# Patient Record
Sex: MALE | Race: BLACK OR AFRICAN AMERICAN
[De-identification: names, ages, dates, MRNs, and addresses within clinical notes are randomized per-mention and may not be internally consistent; named-entity substitution may affect disease eponyms.]

---

## 2019-11-09 ENCOUNTER — HOSPITAL ENCOUNTER (EMERGENCY)
Dept: HOSPITAL 62 - ER | Age: 64
Discharge: HOME | End: 2019-11-09
Payer: OTHER GOVERNMENT

## 2019-11-09 VITALS — DIASTOLIC BLOOD PRESSURE: 72 MMHG | SYSTOLIC BLOOD PRESSURE: 122 MMHG

## 2019-11-09 DIAGNOSIS — J98.01: Primary | ICD-10-CM

## 2019-11-09 DIAGNOSIS — F17.210: ICD-10-CM

## 2019-11-09 DIAGNOSIS — J44.9: ICD-10-CM

## 2019-11-09 DIAGNOSIS — R07.9: ICD-10-CM

## 2019-11-09 LAB
ADD MANUAL DIFF: NO
ALBUMIN SERPL-MCNC: 4.4 G/DL (ref 3.5–5)
ALP SERPL-CCNC: 71 U/L (ref 38–126)
ANION GAP SERPL CALC-SCNC: 10 MMOL/L (ref 5–19)
AST SERPL-CCNC: 37 U/L (ref 17–59)
BASOPHILS # BLD AUTO: 0 10^3/UL (ref 0–0.2)
BASOPHILS NFR BLD AUTO: 0.8 % (ref 0–2)
BILIRUB DIRECT SERPL-MCNC: 0.1 MG/DL (ref 0–0.4)
BILIRUB SERPL-MCNC: 0.4 MG/DL (ref 0.2–1.3)
BUN SERPL-MCNC: 21 MG/DL (ref 7–20)
CALCIUM: 9.7 MG/DL (ref 8.4–10.2)
CHLORIDE SERPL-SCNC: 102 MMOL/L (ref 98–107)
CO2 SERPL-SCNC: 28 MMOL/L (ref 22–30)
EOSINOPHIL # BLD AUTO: 0.3 10^3/UL (ref 0–0.6)
EOSINOPHIL NFR BLD AUTO: 7.6 % (ref 0–6)
ERYTHROCYTE [DISTWIDTH] IN BLOOD BY AUTOMATED COUNT: 14.1 % (ref 11.5–14)
GLUCOSE SERPL-MCNC: 79 MG/DL (ref 75–110)
HCT VFR BLD CALC: 42.3 % (ref 37.9–51)
HGB BLD-MCNC: 14.1 G/DL (ref 13.5–17)
LYMPHOCYTES # BLD AUTO: 1.7 10^3/UL (ref 0.5–4.7)
LYMPHOCYTES NFR BLD AUTO: 36.5 % (ref 13–45)
MCH RBC QN AUTO: 29.3 PG (ref 27–33.4)
MCHC RBC AUTO-ENTMCNC: 33.4 G/DL (ref 32–36)
MCV RBC AUTO: 88 FL (ref 80–97)
MONOCYTES # BLD AUTO: 0.4 10^3/UL (ref 0.1–1.4)
MONOCYTES NFR BLD AUTO: 8.5 % (ref 3–13)
NEUTROPHILS # BLD AUTO: 2.1 10^3/UL (ref 1.7–8.2)
NEUTS SEG NFR BLD AUTO: 46.6 % (ref 42–78)
NT PRO BNP: 111 PG/ML (ref ?–125)
PLATELET # BLD: 284 10^3/UL (ref 150–450)
POTASSIUM SERPL-SCNC: 4.4 MMOL/L (ref 3.6–5)
PROT SERPL-MCNC: 8 G/DL (ref 6.3–8.2)
RBC # BLD AUTO: 4.82 10^6/UL (ref 4.35–5.55)
TOTAL CELLS COUNTED % (AUTO): 100 %
TROPONIN I SERPL-MCNC: < 0.012 NG/ML
WBC # BLD AUTO: 4.5 10^3/UL (ref 4–10.5)

## 2019-11-09 PROCEDURE — 99285 EMERGENCY DEPT VISIT HI MDM: CPT

## 2019-11-09 PROCEDURE — 80053 COMPREHEN METABOLIC PANEL: CPT

## 2019-11-09 PROCEDURE — 83880 ASSAY OF NATRIURETIC PEPTIDE: CPT

## 2019-11-09 PROCEDURE — 94640 AIRWAY INHALATION TREATMENT: CPT

## 2019-11-09 PROCEDURE — 93005 ELECTROCARDIOGRAM TRACING: CPT

## 2019-11-09 PROCEDURE — 71046 X-RAY EXAM CHEST 2 VIEWS: CPT

## 2019-11-09 PROCEDURE — 84484 ASSAY OF TROPONIN QUANT: CPT

## 2019-11-09 PROCEDURE — 85025 COMPLETE CBC W/AUTO DIFF WBC: CPT

## 2019-11-09 PROCEDURE — 96375 TX/PRO/DX INJ NEW DRUG ADDON: CPT

## 2019-11-09 PROCEDURE — 93010 ELECTROCARDIOGRAM REPORT: CPT

## 2019-11-09 PROCEDURE — 96374 THER/PROPH/DIAG INJ IV PUSH: CPT

## 2019-11-09 PROCEDURE — 36415 COLL VENOUS BLD VENIPUNCTURE: CPT

## 2019-11-09 RX ADMIN — ALBUTEROL SULFATE SCH MG: 2.5 SOLUTION RESPIRATORY (INHALATION) at 18:18

## 2019-11-09 RX ADMIN — ALBUTEROL SULFATE SCH MG: 2.5 SOLUTION RESPIRATORY (INHALATION) at 18:17

## 2019-11-09 NOTE — ER DOCUMENT REPORT
ED Medical Screen (RME)





- General


Chief Complaint: Breathing Difficulty


Stated Complaint: DIFFICULTY BREATHING


Time Seen by Provider: 11/09/19 17:19


Mode of Arrival: Ambulatory


Information source: Patient


Notes: 





Patient presents complaining of shortness of breath that started yesterday.  

Patient denies any chest pain nausea vomiting or fever.  Patient does report 

cough but states he has chronic cough.  Patient does have a long history of 

smoking.  Patient does not regularly see a doctor.





I have greeted and performed a rapid initial assessment of this patient.  A 

comprehensive ED assessment and evaluation of the patient, analysis of test 

results and completion of the medical decision making process will be conducted 

by additional ED providers.


TRAVEL OUTSIDE OF THE [place] IN LAST 30 DAYS: No





Past Medical History


Past Surgical History: Reports: Other - Eye surgery





- Immunizations


Hx Diphtheria, Pertussis, Tetanus Vaccination: No





Physical Exam





- Vital signs


Vitals: 





                                        











Temp Pulse Resp BP Pulse Ox


 


 97.8 F   83   16   152/81 H  100 


 


 11/09/19 17:15  11/09/19 17:15  11/09/19 17:15  11/09/19 17:15  11/09/19 17:15














- Respiratory


Respiratory status: No respiratory distress


Chest status: Nontender


Breath sounds: Nonproductive cough, Wheezing





Course





- Vital Signs


Vital signs: 





                                        











Temp Pulse Resp BP Pulse Ox


 


 97.8 F   83   16   152/81 H  100 


 


 11/09/19 17:15  11/09/19 17:15  11/09/19 17:15  11/09/19 17:15  11/09/19 17:15

## 2019-11-09 NOTE — RADIOLOGY REPORT (SQ)
EXAM DESCRIPTION:  CHEST 2 VIEWS



COMPLETED DATE/TIME:  11/9/2019 5:44 pm



REASON FOR STUDY:  sob



COMPARISON:  None.



EXAM PARAMETERS:  NUMBER OF VIEWS: two views

TECHNIQUE: Digital Frontal and Lateral radiographic views of the chest acquired.

RADIATION DOSE: NA

LIMITATIONS: none



FINDINGS:  LUNGS AND PLEURA: No acute infiltrates or effusions.  Calcification overlying right lower 
lung field between posterior 10th and 11th ribs consistent with calcified granuloma.

MEDIASTINUM AND HILAR STRUCTURES: No masses or contour abnormalities.

HEART AND VASCULAR STRUCTURES: Normal heart size and pulmonary vasculature.

BONES: No acute findings.

HARDWARE: None in the chest.

OTHER: No other significant finding.



IMPRESSION:  NO ACUTE DISEASE.



TECHNICAL DOCUMENTATION:  JOB ID:  [id number]

[id number]

 2011 [employer]- All Rights Reserved



Reading location - IP/workstation name: [email or web address]

## 2019-11-09 NOTE — ER DOCUMENT REPORT
ED General





- General


Chief Complaint: Shortness Of Breath


Stated Complaint: DIFFICULTY BREATHING


Time Seen by Provider: 11/09/19 17:19


Primary Care Provider: 


[facility] [[facility] - Follow up as needed


Mode of Arrival: Ambulatory


Information source: Patient


Notes: 


64-year-old man-year-old male presents with shortness of breath and chest 

tightness.  He has had intermittent episodes of difficulties in the past worse 

over the past 24 hours.  He has a 40-year pack per day smoking history and 

denies hematemesis or productive cough.  He complains of a tightness in his 

chest and a difficulty moving air.  He has been self-medicating with a rescue 

inhaler in the past, however has not seen a physician and was not prescribed any

medications.  He denies chest pain, palpitations or swelling in the lower 

extremities.


TRAVEL OUTSIDE OF THE [place] IN LAST 30 DAYS: No





- Related Data


Allergies/Adverse Reactions: 


                                        





No Known Allergies Allergy (Unverified 11/09/19 17:26)


   











Past Medical History





- General


Information source: Patient





- Social History


Smoking Status: Current Every Day Smoker


Chew tobacco use (# tins/day): No


Frequency of alcohol use: Occasional


Drug Abuse: Cocaine


Family History: Reviewed & Not Pertinent


Patient has suicidal ideation: No


Patient has homicidal ideation: No


Past Surgical History: Reports: Other - Eye surgery





- Immunizations


Hx Diphtheria, Pertussis, Tetanus Vaccination: No





Review of Systems





- Review of Systems


Notes: 





REVIEW OF SYSTEMS


GENERAL: Negative for any nausea, vomiting, fevers, chills, or weight loss.


NEUROLOGIC: Negative for any blurry vision, blind spots, double vision, facial 

asymmetry, dysphagia, dysarthria, hemiparesis, hemisensory deficits, vertigo, 

ataxia.


HEENT: Negative for any head trauma, neck trauma, neck stiffness, photophobia, 

phonophobia, sinusitis, rhinitis.


CARDIAC: Negative for any chest pain, dyspnea on exertion, paroxysmal nocturnal 

dyspnea, peripheral edema.


PULMONARY:+shortness of breath, +wheezing, no COPD, or TB exposure.


GASTROINTESTINAL: Negative for any abdominal pain, nausea, vomiting, bright red 

blood per rectum, melena.


GENITOURINARY: Negative for any dysuria, hematuria, incontinence.


INTEGUMENTARY: Negative for any rashes, cuts, insect bites.


RHEUMATOLOGIC: Negative for any joint pains, photosensitive rashes, history of 

vasculitis or kidney problems.


HEMATOLOGIC: Negative for any abnormal bruising, frequent infections or 

bleeding.  I made no change





Physical Exam





- Vital signs


Vitals: 


                                        











Pulse Ox


 


 97 


 


 11/09/19 17:11














- Notes


Notes: 





Reviewed vital signs and nursing note as charted by RN. 


CONSTITUTIONAL: Thin, alert and responsive 64-year-old man in no acute distress.


HEAD: Normocephalic; atraumatic; No swelling


EYES: PERRL; Conjunctivae clear, no drainage; EOMI


ENT: External ears without lesions; External auditory canal is patent; TMs 

without erythema, landmarks clear and well visualized; no rhinorrhea; Pharynx 

without erythema or lesions, no tonsillar hypertrophy, airway patent, mucous me

mbranes pink and moist


NECK: Supple, no cervical lymphadenopathy, no masses


CARD: Regular rate and rhythm; no murmurs, no rubs, no gallops, capillary refill

< 2 seconds, symmetric pulses


RESP:  Respiratory rate and effort are normal. There is normal chest excursion. 

Mild expiratory wheezes, no respiratory distress, no retractions, no stridor, no

nasal flaring, no accessory muscle use.  The lungs are clear to auscultation 

bilaterally, no wheezing, no rales, no rhonchi.  


ABD/GI: Normal bowel sounds; non-distended; soft, non-tender, no rebound, no 

guarding, no palpable organomegaly


EXT: Normal ROM in all joints; non-tender to palpation; no effusions, no edema 


SKIN: Normal color for age and race; warm; dry; good turgor; no acute lesions 

noted


NEURO: No facial asymmetry; Moves all extremities equally; Motor and sensory 

function intact





Course





- Re-evaluation


Re-evalutation: 





11/09/19 20:02


Patient is sleeping and resting comfortably after nebulizer treatment and Solu-

Medrol.  Chest x-ray is negative I discussed those findings with the patient and

his sister, he will be given p.o. antibiotics, steroids, rescue inhaler I 

encouraged him to decrease his smoking [facility].  The patient affirms and understanding of this plan and is ready to go 

home.





- Vital Signs


Vital signs: 


                                        











Temp Pulse Resp BP Pulse Ox


 


 97.8 F   83   17   125/79   94 


 


 11/09/19 17:15  11/09/19 17:15  11/09/19 19:01  11/09/19 19:00  11/09/19 19:01














- Laboratory


Result Diagrams: 


                                 11/09/19 18:29





                                 11/09/19 18:29


Laboratory results interpreted by me: 


                                        











  11/09/19 11/09/19





  18:29 18:29


 


RDW  14.1 H 


 


Eos % (Auto)  7.6 H 


 


BUN   21 H


 


Creatinine   1.62 H


 


Est GFR ( Amer)   53 L


 


Est GFR (MDRD) Non-Af   43 L














- Diagnostic Test


Radiology reviewed: Image reviewed - Normal chest x-ray no infiltrate or 

effusion., Reports reviewed





Discharge





- Discharge


Clinical Impression: 


 Bronchospasm





Acute bronchitis


Qualifiers:


 Bronchitis organism: other organism Qualified Code(s): J20.8 - Acute bronchitis

due to other specified organisms





COPD (chronic obstructive pulmonary disease)


Qualifiers:


 COPD type: unspecified COPD Qualified Code(s): J44.9 - Chronic obstructive 

pulmonary disease, unspecified





Condition: Good


Disposition: HOME, SELF-CARE


Forms:  Smoking Cessation Education


Referrals: 


[facility] [[facility]] - Follow up as needed

## 2019-11-09 NOTE — EKG REPORT
SEVERITY:- ABNORMAL ECG -

SINUS RHYTHM

BIATRIAL ABNORMALITIES

NONSPECIFIC T ABNORMALITIES, ANT-LAT LEADS

:

Confirmed by: Mary Anne Howard 09-Nov-2019 21:30:04

## 2020-03-12 ENCOUNTER — HOSPITAL ENCOUNTER (EMERGENCY)
Dept: HOSPITAL 62 - ER | Age: 65
Discharge: HOME | End: 2020-03-12
Payer: OTHER GOVERNMENT

## 2020-03-12 VITALS — DIASTOLIC BLOOD PRESSURE: 101 MMHG | SYSTOLIC BLOOD PRESSURE: 152 MMHG

## 2020-03-12 DIAGNOSIS — Z79.899: ICD-10-CM

## 2020-03-12 DIAGNOSIS — R06.02: ICD-10-CM

## 2020-03-12 DIAGNOSIS — F14.10: ICD-10-CM

## 2020-03-12 DIAGNOSIS — F17.200: ICD-10-CM

## 2020-03-12 DIAGNOSIS — J98.01: ICD-10-CM

## 2020-03-12 DIAGNOSIS — J44.9: Primary | ICD-10-CM

## 2020-03-12 DIAGNOSIS — I51.7: ICD-10-CM

## 2020-03-12 LAB
ADD MANUAL DIFF: NO
ALBUMIN SERPL-MCNC: 4.3 G/DL (ref 3.5–5)
ALP SERPL-CCNC: 82 U/L (ref 38–126)
ANION GAP SERPL CALC-SCNC: 5 MMOL/L (ref 5–19)
AST SERPL-CCNC: 38 U/L (ref 17–59)
BASOPHILS # BLD AUTO: 0 10^3/UL (ref 0–0.2)
BASOPHILS NFR BLD AUTO: 0.8 % (ref 0–2)
BILIRUB DIRECT SERPL-MCNC: 0 MG/DL (ref 0–0.4)
BILIRUB SERPL-MCNC: 0.3 MG/DL (ref 0.2–1.3)
BUN SERPL-MCNC: 30 MG/DL (ref 7–20)
CALCIUM: 9.2 MG/DL (ref 8.4–10.2)
CHLORIDE SERPL-SCNC: 103 MMOL/L (ref 98–107)
CO2 SERPL-SCNC: 32 MMOL/L (ref 22–30)
EOSINOPHIL # BLD AUTO: 0.2 10^3/UL (ref 0–0.6)
EOSINOPHIL NFR BLD AUTO: 5.6 % (ref 0–6)
ERYTHROCYTE [DISTWIDTH] IN BLOOD BY AUTOMATED COUNT: 14.3 % (ref 11.5–14)
GLUCOSE SERPL-MCNC: 103 MG/DL (ref 75–110)
HCT VFR BLD CALC: 40.8 % (ref 37.9–51)
HGB BLD-MCNC: 13.8 G/DL (ref 13.5–17)
LYMPHOCYTES # BLD AUTO: 1.7 10^3/UL (ref 0.5–4.7)
LYMPHOCYTES NFR BLD AUTO: 40.8 % (ref 13–45)
MCH RBC QN AUTO: 29 PG (ref 27–33.4)
MCHC RBC AUTO-ENTMCNC: 33.7 G/DL (ref 32–36)
MCV RBC AUTO: 86 FL (ref 80–97)
MONOCYTES # BLD AUTO: 0.6 10^3/UL (ref 0.1–1.4)
MONOCYTES NFR BLD AUTO: 13.7 % (ref 3–13)
NEUTROPHILS # BLD AUTO: 1.6 10^3/UL (ref 1.7–8.2)
NEUTS SEG NFR BLD AUTO: 39.1 % (ref 42–78)
PLATELET # BLD: 213 10^3/UL (ref 150–450)
POTASSIUM SERPL-SCNC: 4.7 MMOL/L (ref 3.6–5)
PROT SERPL-MCNC: 7.7 G/DL (ref 6.3–8.2)
RBC # BLD AUTO: 4.75 10^6/UL (ref 4.35–5.55)
TOTAL CELLS COUNTED % (AUTO): 100 %
WBC # BLD AUTO: 4.2 10^3/UL (ref 4–10.5)

## 2020-03-12 PROCEDURE — 85025 COMPLETE CBC W/AUTO DIFF WBC: CPT

## 2020-03-12 PROCEDURE — 94640 AIRWAY INHALATION TREATMENT: CPT

## 2020-03-12 PROCEDURE — 93010 ELECTROCARDIOGRAM REPORT: CPT

## 2020-03-12 PROCEDURE — 99285 EMERGENCY DEPT VISIT HI MDM: CPT

## 2020-03-12 PROCEDURE — 93005 ELECTROCARDIOGRAM TRACING: CPT

## 2020-03-12 PROCEDURE — 71046 X-RAY EXAM CHEST 2 VIEWS: CPT

## 2020-03-12 PROCEDURE — 36415 COLL VENOUS BLD VENIPUNCTURE: CPT

## 2020-03-12 PROCEDURE — 80053 COMPREHEN METABOLIC PANEL: CPT

## 2020-03-12 NOTE — EKG REPORT
SEVERITY:- ABNORMAL ECG -

SINUS RHYTHM

CONSIDER LEFT VENTRICULAR HYPERTROPHY

ANTERIOR ST ELEVATION, PROBABLY DUE TO LVH

:

Confirmed by: Mary Anne Howard 12-Mar-2020 09:57:28

## 2020-03-12 NOTE — RADIOLOGY REPORT (SQ)
EXAM:

  XR Chest, 2 Views



EXAM DATE/TIME:

  03/12/2020 1:53 AM



CLINICAL HISTORY:

  The patient is 64 years old and is Male; shortness of breath



TECHNIQUE:

  Frontal and lateral views of the chest.



COMPARISON:

  Chest radiograph from 11/09/2019



FINDINGS:

  LUNGS:  The lungs are hyperinflated. Probable small calcified

granuloma projecting over the right lower lung. The lungs are

otherwise clear. No consolidation.

  PLEURAL SPACE:  Unremarkable.  No pneumothorax.

  HEART:  No significant enlargement of the cardiac silhouette.

  MEDIASTINUM:  Unremarkable.

  BONES/JOINTS:  No acute osseous findings.



IMPRESSION:     

  No acute findings visualized in the chest.

## 2020-03-12 NOTE — ER DOCUMENT REPORT
ED Respiratory Problem





- General


Chief Complaint: Breathing Difficulty


Stated Complaint: DIFFICULTY BREATHING


Time Seen by Provider: 03/12/20 03:32


Primary Care Provider: 


ELEAZAR BARTHOLOMEW [Primary Care Provider] - Follow up as needed


Notes: 





64-year-old man presents to the emergency department complaining of shortness of

breath and difficulty breathing.  Apparently he has a history of shortness of 

breath and uses an inhaler.  States that he has been using inhaler more 

frequently lately.  He denies chest pain or palpitations.  He also denies fever 

or productive cough.He has a 40-year pack per day smoking history and denies 

hematemesis or productive cough. 


TRAVEL OUTSIDE OF THE [place] IN LAST 30 DAYS: No





- Related Data


Allergies/Adverse Reactions: 


                                        





No Known Allergies Allergy (Unverified 11/09/19 17:26)


   








Home Medications: albuterol inhaler





Past Medical History





- Social History


Smoking Status: Current Every Day Smoker


Frequency of alcohol use: Occasional


Drug Abuse: Cocaine


Family History: Reviewed & Not Pertinent


Patient has suicidal ideation: No


Patient has homicidal ideation: No


Pulmonary Medical History: Reports: Hx Asthma, Hx COPD


Past Surgical History: Reports: Other - Eye surgery





- Immunizations


Hx Diphtheria, Pertussis, Tetanus Vaccination: No





Review of Systems





- Review of Systems


Notes: 





Constitutional: Negative for fever.


HENT: Negative for sore throat.


Eyes: Negative for visual changes.


Cardiovascular: Negative for chest pain.


Respiratory: + shortness of breath.


Gastrointestinal: Negative for abdominal pain, vomiting or diarrhea.


Genitourinary: Negative for dysuria.


Musculoskeletal: Negative for back pain.


Skin: Negative for rash.


Neurological: Negative for headaches, weakness or numbness.





10 point ROS negative except as marked above and in HPI.





Physical Exam





- Vital signs


Vitals: 


                                        











Temp Pulse Resp BP Pulse Ox


 


 97.9 F   71   20   173/90 H  95 


 


 03/12/20 00:53  03/12/20 00:53  03/12/20 00:53  03/12/20 00:53  03/12/20 00:53














- Notes


Notes: 





PHYSICAL EXAMINATION:


 


Physical Exam:


General: Well-nourished well-developed 64-year-old man in no acute distress


HEENT: NC/AT, pupils equal round and reactive to light, MM moist,nares clear, 

oropharynx clear, airway patent


Neck: supple, no adenopathy, no masses.  Good range of motion


Lungs: clear, no wheezing, no rales no rhonchi


CVS: Regular rate and rhythm no murmur gallop or rub


Abdomen: Soft, active, nontender, no masses, no hepatosplenomegaly


Ext:   No edema, clubbing or cyanosis.


Neuro: Alert and responsive, moving all 4 extremities on command, cranial nerves

intact, no focal findings


Skin: Intact no open lesions, no rash


PSYCH: Normal mood, normal affect.


 








Course





- Re-evaluation


Re-evalutation: 





03/12/20 06:17


Nebulizer treatment with improvement of his symptoms states that he feels much 

better.  Patient is resting quietly on the bed sleeping, prescription for 

albuterol inhaler and prednisone will be given at the time of discharge.  I have

encouraged the patient to follow-up with the [facility] and he states that he 

will.


03/12/20 06:28








- Vital Signs


Vital signs: 


                                        











Temp Pulse Resp BP Pulse Ox


 


 97.7 F   71   16   133/86 H  98 


 


 03/12/20 06:01  03/12/20 00:53  03/12/20 06:01  03/12/20 06:01  03/12/20 06:01














- Laboratory


Result Diagrams: 


                                 03/12/20 01:35





                                 03/12/20 01:35


Laboratory results interpreted by me: 


                                        











  03/12/20 03/12/20





  01:35 01:35


 


RDW  14.3 H 


 


Mono % (Auto)  13.7 H 


 


Absolute Neuts (auto)  1.6 L 


 


Seg Neutrophils %  39.1 L 


 


Carbon Dioxide   32 H


 


BUN   30 H


 


Creatinine   1.54 H


 


Est GFR ( Amer)   56 L


 


Est GFR (MDRD) Non-Af   46 L














- Diagnostic Test


Radiology reviewed: Image reviewed, Reports reviewed - Chest x-ray: No acute 

infiltrate or effusion





- EKG Interpretation by Me


EKG shows normal: Sinus rhythm - Rate of 54, LVH, repolarization abnormality.





Discharge





- Discharge


Clinical Impression: 


 Acute bronchospasm





COPD (chronic obstructive pulmonary disease)


Qualifiers:


 COPD type: chronic bronchitis Chronic bronchitis type: simple Qualified 

Code(s): J41.0 - Simple chronic bronchitis





Condition: Good


Disposition: HOME, SELF-CARE


Instructions:  Chronic Obstructive Lung Disease ([facility])


Additional Instructions: 


You were seen for a COPD exacerbation.  Your symptoms improved with treatment 

here in the emergency department.  However, it is very important that you return

to the emergency department immediately if you began to have worsening 

difficulty breathing that does not respond to your normal home nebulizers.  You 

are also being sent home on a five-day course of steroids that you should start 

taking tomorrow.  Please also follow closely with your primary care physician.  

You should eturn to emergency department if you develop fever greater than 101, 

persistent cough, persistent vomiting, pass out, or any other symptoms that are 

concerning to you.


Prescriptions: 


Prednisone [Deltasone 20 mg Tablet] 1 tab PO BID 5 Days #10 tablet


Albuterol Sulfate [Proair HFA Inhalation Aerosol 8.5 gm MDI] 2 puff IH Q4H PRN 

#1 mdi


 PRN Reason: 


Referrals: 


[facility] [[facility] - Follow up as needed

## 2020-04-12 ENCOUNTER — HOSPITAL ENCOUNTER (INPATIENT)
Dept: HOSPITAL 62 - ER | Age: 65
LOS: 3 days | Discharge: HOME | DRG: 190 | End: 2020-04-15
Attending: INTERNAL MEDICINE | Admitting: INTERNAL MEDICINE
Payer: OTHER GOVERNMENT

## 2020-04-12 DIAGNOSIS — Z03.818: ICD-10-CM

## 2020-04-12 DIAGNOSIS — J44.0: ICD-10-CM

## 2020-04-12 DIAGNOSIS — E87.1: ICD-10-CM

## 2020-04-12 DIAGNOSIS — J96.01: ICD-10-CM

## 2020-04-12 DIAGNOSIS — N17.9: ICD-10-CM

## 2020-04-12 DIAGNOSIS — F17.210: ICD-10-CM

## 2020-04-12 DIAGNOSIS — Z60.2: ICD-10-CM

## 2020-04-12 DIAGNOSIS — J44.1: Primary | ICD-10-CM

## 2020-04-12 DIAGNOSIS — E87.5: ICD-10-CM

## 2020-04-12 DIAGNOSIS — E86.0: ICD-10-CM

## 2020-04-12 DIAGNOSIS — Z79.899: ICD-10-CM

## 2020-04-12 DIAGNOSIS — Z72.89: ICD-10-CM

## 2020-04-12 DIAGNOSIS — J18.9: ICD-10-CM

## 2020-04-12 LAB
A TYPE INFLUENZA AG: NEGATIVE
ADD MANUAL DIFF: NO
ALBUMIN SERPL-MCNC: 4.4 G/DL (ref 3.5–5)
ALP SERPL-CCNC: 70 U/L (ref 38–126)
ANION GAP SERPL CALC-SCNC: 11 MMOL/L (ref 5–19)
APPEARANCE UR: (no result)
APTT PPP: YELLOW S
AST SERPL-CCNC: 51 U/L (ref 17–59)
B INFLUENZA AG: NEGATIVE
BASOPHILS # BLD AUTO: 0 10^3/UL (ref 0–0.2)
BASOPHILS NFR BLD AUTO: 0.2 % (ref 0–2)
BILIRUB DIRECT SERPL-MCNC: 0.1 MG/DL (ref 0–0.4)
BILIRUB SERPL-MCNC: 1.3 MG/DL (ref 0.2–1.3)
BILIRUB UR QL STRIP: NEGATIVE
BUN SERPL-MCNC: 37 MG/DL (ref 7–20)
CALCIUM: 9.1 MG/DL (ref 8.4–10.2)
CHLORIDE SERPL-SCNC: 100 MMOL/L (ref 98–107)
CK MB SERPL-MCNC: 0.75 NG/ML (ref ?–4.55)
CK SERPL-CCNC: 344 U/L (ref 55–170)
CO2 SERPL-SCNC: 24 MMOL/L (ref 22–30)
CRP SERPL-MCNC: 264.2 MG/L (ref ?–10)
EOSINOPHIL # BLD AUTO: 0 10^3/UL (ref 0–0.6)
EOSINOPHIL NFR BLD AUTO: 0 % (ref 0–6)
ERYTHROCYTE [DISTWIDTH] IN BLOOD BY AUTOMATED COUNT: 15.1 % (ref 11.5–14)
FERRITIN SERPL-MCNC: 340 NG/ML (ref 17.9–464)
GLUCOSE SERPL-MCNC: 119 MG/DL (ref 75–110)
GLUCOSE UR STRIP-MCNC: NEGATIVE MG/DL
HCT VFR BLD CALC: 45 % (ref 37.9–51)
HGB BLD-MCNC: 15 G/DL (ref 13.5–17)
KETONES UR STRIP-MCNC: NEGATIVE MG/DL
LYMPHOCYTES # BLD AUTO: 1.7 10^3/UL (ref 0.5–4.7)
LYMPHOCYTES NFR BLD AUTO: 14.4 % (ref 13–45)
MCH RBC QN AUTO: 28.7 PG (ref 27–33.4)
MCHC RBC AUTO-ENTMCNC: 33.3 G/DL (ref 32–36)
MCV RBC AUTO: 86 FL (ref 80–97)
MONOCYTES # BLD AUTO: 0.5 10^3/UL (ref 0.1–1.4)
MONOCYTES NFR BLD AUTO: 4.6 % (ref 3–13)
NEUTROPHILS # BLD AUTO: 9.4 10^3/UL (ref 1.7–8.2)
NEUTS SEG NFR BLD AUTO: 80.8 % (ref 42–78)
NITRITE UR QL STRIP: NEGATIVE
NT PRO BNP: 508 PG/ML (ref ?–125)
PH UR STRIP: 5 [PH] (ref 5–9)
PLATELET # BLD: 221 10^3/UL (ref 150–450)
POTASSIUM SERPL-SCNC: 5.2 MMOL/L (ref 3.6–5)
PROT SERPL-MCNC: 8 G/DL (ref 6.3–8.2)
PROT UR STRIP-MCNC: 30 MG/DL
RBC # BLD AUTO: 5.22 10^6/UL (ref 4.35–5.55)
SP GR UR STRIP: 1.02
TOTAL CELLS COUNTED % (AUTO): 100 %
TROPONIN I SERPL-MCNC: 0.01 NG/ML
UROBILINOGEN UR-MCNC: NEGATIVE MG/DL (ref ?–2)
WBC # BLD AUTO: 11.6 10^3/UL (ref 4–10.5)

## 2020-04-12 PROCEDURE — 94799 UNLISTED PULMONARY SVC/PX: CPT

## 2020-04-12 PROCEDURE — 87040 BLOOD CULTURE FOR BACTERIA: CPT

## 2020-04-12 PROCEDURE — 87070 CULTURE OTHR SPECIMN AEROBIC: CPT

## 2020-04-12 PROCEDURE — 80048 BASIC METABOLIC PNL TOTAL CA: CPT

## 2020-04-12 PROCEDURE — 85025 COMPLETE CBC W/AUTO DIFF WBC: CPT

## 2020-04-12 PROCEDURE — 86140 C-REACTIVE PROTEIN: CPT

## 2020-04-12 PROCEDURE — 82728 ASSAY OF FERRITIN: CPT

## 2020-04-12 PROCEDURE — 82550 ASSAY OF CK (CPK): CPT

## 2020-04-12 PROCEDURE — 71045 X-RAY EXAM CHEST 1 VIEW: CPT

## 2020-04-12 PROCEDURE — 85379 FIBRIN DEGRADATION QUANT: CPT

## 2020-04-12 PROCEDURE — 87804 INFLUENZA ASSAY W/OPTIC: CPT

## 2020-04-12 PROCEDURE — 36415 COLL VENOUS BLD VENIPUNCTURE: CPT

## 2020-04-12 PROCEDURE — 85027 COMPLETE CBC AUTOMATED: CPT

## 2020-04-12 PROCEDURE — 87635 SARS-COV-2 COVID-19 AMP PRB: CPT

## 2020-04-12 PROCEDURE — 80053 COMPREHEN METABOLIC PANEL: CPT

## 2020-04-12 PROCEDURE — 82553 CREATINE MB FRACTION: CPT

## 2020-04-12 PROCEDURE — 99285 EMERGENCY DEPT VISIT HI MDM: CPT

## 2020-04-12 PROCEDURE — 96365 THER/PROPH/DIAG IV INF INIT: CPT

## 2020-04-12 PROCEDURE — 93010 ELECTROCARDIOGRAM REPORT: CPT

## 2020-04-12 PROCEDURE — 87205 SMEAR GRAM STAIN: CPT

## 2020-04-12 PROCEDURE — 84484 ASSAY OF TROPONIN QUANT: CPT

## 2020-04-12 PROCEDURE — 81001 URINALYSIS AUTO W/SCOPE: CPT

## 2020-04-12 PROCEDURE — 83880 ASSAY OF NATRIURETIC PEPTIDE: CPT

## 2020-04-12 PROCEDURE — 93005 ELECTROCARDIOGRAM TRACING: CPT

## 2020-04-12 RX ADMIN — ENOXAPARIN SODIUM SCH MG: 80 INJECTION SUBCUTANEOUS at 21:12

## 2020-04-12 RX ADMIN — NICOTINE SCH EACH: 21 PATCH, EXTENDED RELEASE TOPICAL at 16:49

## 2020-04-12 RX ADMIN — ALBUTEROL SULFATE SCH PUFF: 90 AEROSOL, METERED RESPIRATORY (INHALATION) at 17:00

## 2020-04-12 RX ADMIN — Medication SCH ML: at 16:50

## 2020-04-12 RX ADMIN — FAMOTIDINE SCH MG: 20 TABLET, FILM COATED ORAL at 21:14

## 2020-04-12 RX ADMIN — MELATONIN SCH MG: 3 TAB ORAL at 21:13

## 2020-04-12 RX ADMIN — Medication SCH ML: at 21:14

## 2020-04-12 RX ADMIN — OXYCODONE HYDROCHLORIDE AND ACETAMINOPHEN SCH MG: 500 TABLET ORAL at 17:01

## 2020-04-12 RX ADMIN — SODIUM CHLORIDE PRN MLS/HR: 9 INJECTION, SOLUTION INTRAVENOUS at 19:00

## 2020-04-12 RX ADMIN — ACETAMINOPHEN PRN MG: 325 TABLET ORAL at 21:14

## 2020-04-12 RX ADMIN — ACETAMINOPHEN PRN MG: 325 TABLET ORAL at 16:49

## 2020-04-12 RX ADMIN — GUAIFENESIN SCH MG: 600 TABLET, EXTENDED RELEASE ORAL at 21:13

## 2020-04-12 SDOH — SOCIAL STABILITY - SOCIAL INSECURITY: PROBLEMS RELATED TO LIVING ALONE: Z60.2

## 2020-04-12 NOTE — ER DOCUMENT REPORT
ED General





- General


Chief Complaint: Shortness Of Breath


Stated Complaint: SHORTNESS OF BREATH


Time Seen by Provider: 04/12/20 12:11


Primary Care Provider: 


[place] [Primary [facility] Provider] - Follow up as needed


TRAVEL OUTSIDE OF THE [place] IN LAST 30 DAYS: No





- HPI


Notes: 





Chief complaint: Shortness of breath, wheezing, pleuritic chest pain, productive

cough and fever





64-year-old [facility] who [facility] in excess of 1 

pack of cigarettes per day with ongoing smoking and has a history of COPD.  Not 

currently using oxygen at home.  Symptoms as noted above progressively worsening

for 3 days.  No recent travel.  No known exposure to Covid-19.  No known 

allergies.  Patient was initially noted to have an O2 saturation of 85% on room 

air.  Currently on 4 L nasal O2 with 98% O2 saturation.





- Related Data


Allergies/Adverse Reactions: 


                                        





No Known Allergies Allergy (Unverified 11/09/19 17:26)


   











Past Medical History





- General


Information source: Patient, Emergency Med Personnel





- Social History


Smoking Status: Current Every Day Smoker


Chew tobacco use (# tins/day): No


Frequency of alcohol use: None


Drug Abuse: None


Family History: Reviewed & Not Pertinent


Patient has suicidal ideation: No


Patient has homicidal ideation: No


Pulmonary Medical History: Reports: Hx Asthma, Hx COPD


Past Surgical History: Reports: Other - Eye surgery





- Immunizations


Hx Diphtheria, Pertussis, Tetanus Vaccination: No





Review of Systems





- Review of Systems


Notes: 





Constitutional: As per HPI.


HENT: Negative for sore throat.


Eyes: Negative for visual changes.


Cardiovascular: Pleuritic chest pain on the left.


Respiratory: As per HPI.


Gastrointestinal: Negative for abdominal pain, vomiting or diarrhea.


Genitourinary: Negative for dysuria.


Musculoskeletal: Negative for back pain.


Skin: Negative for rash.


Neurological: Negative for headaches, weakness or numbness.





10 point ROS negative except as marked above and in HPI.








Physical Exam





- Vital signs


Vitals: 


                                        











BP


 


 111/68 


 


 04/12/20 11:09














- Notes


Notes: 











GENERAL: Slender male appearing approximately stated age who appears mildly 

dyspneic.





SKIN: Good turgor no rashes.





HEAD: Normocephalic atraumatic.





EYES: PERRLA.  EOMI.  Conjunctivae and sclerae clear.





EARS: CANALS AND TMS CLEAR.





NOSE: CLEAR.





MOUTH: Moist mucosa.  Good dentition.  No stridor or edema.  No drooling.





NECK: Supple.  No masses or thyromegaly.  No adenopathy.  Carotids 2+ without 

bruits.  No JVD.





BACK: Symmetrical without tenderness.





CHEST: Mildly tachypneic.  Diminished breath sounds left base with diffuse faint

wheezes.





HEART: Regular rhythm.  No murmur gallop or rub.





ABDOMEN: Soft nontender without masses, organomegaly or rebound.  Bowel sounds 

normally active.  No bruits.





GENITALIA: Deferred.





EXTREMITIES: No edema.  No calf tenderness.  Cap refill less than 1.5 seconds.  

Dorsalis pedis and posterior tibial pulses 3+ and symmetrical.





NEUROLOGICAL: GCS 15.  Alert and oriented x3.  Normal gait.  Fluent speech.  

Cranial nerves II through XII intact.  Sensorimotor and cerebellar normal.  

Normal tone.





PSYCHIATRIC: Appropriate affect.





Course





- Re-evaluation


Re-evalutation: 





04/12/20 12:49


Blood cultures obtained.  IV Rocephin and Zithromax.  IV Solu-Medrol.  Albuterol

metered-dose inhaler.  Because of the patient's new oxygen requirement he will 

need to be admitted.  Influenza screen is pending.  If this is negative we will 

also need to obtain screen for Covid 19


04/12/20 13:12


Covid swab requested.





- Vital Signs


Vital signs: 


                                        











Temp Pulse Resp BP Pulse Ox


 


 97.6 F      33 H  120/69   92 


 


 04/12/20 12:00     04/12/20 12:00  04/12/20 12:00  04/12/20 12:00














- Laboratory


Result Diagrams: 


                                 04/12/20 11:21





                                 04/12/20 11:21


Laboratory results interpreted by me: 


                                        











  04/12/20 04/12/20 04/12/20





  11:21 11:21 11:21


 


WBC  11.6 H  


 


RDW  15.1 H  


 


Absolute Neuts (auto)  9.4 H  


 


Seg Neutrophils %  80.8 H  


 


Sodium   134.7 L 


 


Potassium   5.2 H 


 


BUN   37 H 


 


Creatinine   2.21 H 


 


Est GFR ( Amer)   37 L 


 


Est GFR (MDRD) Non-Af   30 L 


 


Glucose   119 H 


 


Creatine Kinase   344 H 


 


NT-Pro-B Natriuret Pep    508 H














- Diagnostic Test


Radiology reviewed: Reports reviewed


Radiology results interpreted by me: 





04/12/20 12:45


Left lower lobe pneumonia per radiologist.





- EKG Interpretation by Me


Additional EKG results interpreted by me: 





04/12/20 12:46


Twelve-lead EKG from 1137 hrs. reviewed contemporaneously by me showing biatrial

abnormality and sinus tachycardia with a rate of 105.  QRS axis is 34 degrees.  

Intervals are normal.  No acute ST/T wave changes.





Discharge





- Discharge


Clinical Impression: 


 COPD exacerbation





Left lower lobe pneumonia


Qualifiers:


 Pneumonia type: due to unspecified organism Qualified Code(s): J18.9 - 

Pneumonia, unspecified organism





Condition: Good


Disposition: ADMITTED AS INPATIENT


Admitting Provider: [place]


Unit Admitted: Medical Floor


Referrals: 


[facility] [[facility] - Follow up as needed

## 2020-04-12 NOTE — EKG REPORT
SEVERITY:- ABNORMAL ECG -

SINUS TACHYCARDIA

BIATRIAL ABNORMALITIES

BORDERLINE T WAVE ABNORMALITIES

:

Confirmed by: Bhavya Farris MD 12-Apr-2020 22:27:52

## 2020-04-12 NOTE — RADIOLOGY REPORT (SQ)
EXAM DESCRIPTION:  CHEST SINGLE VIEW



IMAGES COMPLETED DATE/TIME:  4/12/2020 11:55 am



REASON FOR STUDY:  sob



COMPARISON:  3/12/2020



EXAM PARAMETERS:  NUMBER OF VIEWS: One view.

TECHNIQUE: Single frontal radiographic view of the chest acquired.

RADIATION DOSE: NA

LIMITATIONS: None.



FINDINGS:  LUNGS AND PLEURA: Segmental consolidation with air bronchograms in the left lower lobe.  S
mall left pleural effusion.

MEDIASTINUM AND HILAR STRUCTURES: No masses.  Contour normal.

HEART AND VASCULAR STRUCTURES: Heart normal in size.  Normal vasculature.

BONES: No acute findings.

HARDWARE: None in the chest.

OTHER: No other significant finding.



IMPRESSION:  Left lower lobe pneumonia.



TECHNICAL DOCUMENTATION:  JOB ID:  [id number]

 2011 [employer]- All Rights Reserved



Reading location - IP/workstation name: [email or web address]

## 2020-04-12 NOTE — PDOC H&P
History of Present Illness


Admission Date/PCP: 


  04/12/20 13:12





  [facility]





Patient complains of: shortness of breath


History of Present Illness: 


ELLYN NORWOOD is a 64 year old male with a past medical history of COPD and 

tobacco dependence with continuous use who presented to the emergency department

today with a complaint of sudden onset of shortness of breath 3 days ago that 

has progressively worsened associated with subjective fevers and chills at home 

and a nonproductive cough.


Evaluation in the emergency department revealed Hypoxia on room air, tachypnea, 

but otherwise stable vital signs, mild leukocytosis of 11.6, acute kidney injury

evidenced by elevated creatinine, BUN, hyponatremia, hyperkalemia, proBNP of 508

with normal troponin.  Influenza was negative.  Chest x-ray demonstrated a left 

lower lobe infiltrate.


He was provided nebulizer treatments, Solu-Medrol, IV Rocephin and azithromycin,

and supplemental oxygen.


He is referred to the hospitalist service for admission and management of the 

above-stated complaints and findings.





Past Medical History


Cardiac Medical History: Reports: None


Pulmonary Medical History: Reports: Asthma, Chronic Obstructive Pulmonary 

Disease (COPD)


EENT Medical History: Reports: None


Neurological Medical History: Reports: None


Endocrine Medical History: Reports: None


Renal/ Medical History: Reports: None


Malignancy Medical History: Reports: None


GI Medical History: Reports: None


Musculoskeltal Medical History: Reports: None


Skin Medical History: Reports: None


Psychiatric Medical History: Reports: Tobacco Dependency


Traumatic Medical History: Reports: None


Hematology: Reports: None


Infectious Medical History: Reports: None





Past Surgical History


Past Surgical History: Reports: Other - Eye surgery





Social History


Information Source: Patient


Lives with: Alone


Smoking Status: Current Every Day Smoker


Electronic Cigarette use?: No


Frequency of Alcohol Use: Social


Hx Recreational Drug Use: No


Hx Prescription Drug Abuse: No





- Advance Directive


Resuscitation Status: Full Code





Family History


Family History: Reviewed & Not Pertinent


Parental Family History Reviewed: Yes


Children Family History Reviewed: Yes


Sibling(s) Family History Reviewed.: Yes





Medication/Allergy


Allergies/Adverse Reactions: 


                                        





No Known Allergies Allergy (Unverified 11/09/19 17:26)


   











Review of Systems


Constitutional: PRESENT: chills, fever(s).  ABSENT: headache(s), weight gain, 

weight loss


Eyes: ABSENT: visual disturbances


Ears: ABSENT: hearing changes


Cardiovascular: PRESENT: dyspnea on exertion.  ABSENT: chest pain, edema, 

orthropnea, palpitations


Respiratory: PRESENT: cough, dyspnea, sputum.  ABSENT: hemoptysis


Gastrointestinal: ABSENT: abdominal pain, constipation, diarrhea, hematemesis, 

hematochezia, nausea, vomiting


Genitourinary: ABSENT: dysuria, hematuria


Musculoskeletal: ABSENT: joint swelling


Integumentary: ABSENT: rash, wounds


Neurological: ABSENT: abnormal gait, abnormal speech, confusion, dizziness, 

focal weakness, syncope


Psychiatric: ABSENT: anxiety, depression, homidical ideation, suicidal ideation


Endocrine: ABSENT: cold intolerance, heat intolerance, polydipsia, polyuria


Hematologic/Lymphatic: ABSENT: easy bleeding, easy bruising





Physical Exam


Vital Signs: 


                                        











Temp Pulse Resp BP Pulse Ox


 


 97.6 F      33 H  120/69   92 


 


 04/12/20 12:00     04/12/20 12:00  04/12/20 12:00  04/12/20 12:00








                                 Intake & Output











 04/11/20 04/12/20 04/13/20





 06:59 06:59 06:59


 


Weight   74.843 kg











General appearance: PRESENT: no acute distress, well-developed, well-nourished


Head exam: PRESENT: atraumatic, normocephalic


Eye exam: PRESENT: conjunctiva pink, EOMI, PERRLA.  ABSENT: scleral icterus


Ear exam: PRESENT: normal external ear exam


Mouth exam: PRESENT: moist, tongue midline


Neck exam: ABSENT: carotid bruit, JVD, lymphadenopathy, thyromegaly


Respiratory exam: PRESENT: decreased breath sounds - Left greater than right, 

prolonged expiratory phas, rhonchi, symmetrical, tachypnea, wheezes, other - 

Supplemental oxygen


Cardiovascular exam: PRESENT: RRR, +S1, +S2.  ABSENT: diastolic murmur, rubs, 

systolic murmur


Pulses: PRESENT: normal dorsalis pedis pul


Vascular exam: PRESENT: normal capillary refill


GI/Abdominal exam: PRESENT: normal bowel sounds, soft.  ABSENT: distended, 

guarding, mass, organolmegaly, rebound, tenderness


Rectal exam: PRESENT: deferred


Extremities exam: PRESENT: full ROM.  ABSENT: calf tenderness, clubbing, pedal 

edema


Neurological exam: PRESENT: alert, awake, oriented to person, oriented to place,

oriented to time, oriented to situation, CN II-XII grossly intact.  ABSENT: 

motor sensory deficit


Psychiatric exam: PRESENT: appropriate affect, normal mood.  ABSENT: homicidal 

ideation, suicidal ideation


Skin exam: PRESENT: dry, intact, warm.  ABSENT: cyanosis, rash





Results


Laboratory Results: 


                                        





                                 04/12/20 11:21 





                                 04/12/20 11:21 





                                        











  04/12/20 04/12/20 04/12/20





  11:21 11:21 11:21


 


WBC  11.6 H  


 


RBC  5.22  


 


Hgb  15.0  


 


Hct  45.0  


 


MCV  86  


 


MCH  28.7  


 


MCHC  33.3  


 


RDW  15.1 H  


 


Plt Count  221  


 


Seg Neutrophils %  80.8 H  


 


Sodium   134.7 L 


 


Potassium   5.2 H 


 


Chloride   100 


 


Carbon Dioxide   24 


 


Anion Gap   11 


 


BUN   37 H 


 


Creatinine   2.21 H 


 


Est GFR ( Amer)   37 L 


 


Glucose   119 H 


 


Calcium   9.1 


 


Total Bilirubin   1.3 


 


AST   51 


 


Alkaline Phosphatase   70 


 


C-Reactive Protein    264.2 H


 


Total Protein   8.0 


 


Albumin   4.4 








                                        











  04/12/20 04/12/20





  11:21 11:21


 


Creatine Kinase  344 H 


 


CK-MB (CK-2)   0.75


 


Troponin I   0.012


 


NT-Pro-B Natriuret Pep   508 H











Impressions: 


                                        





Chest X-Ray  04/12/20 11:23


IMPRESSION:  Left lower lobe pneumonia.


 














Assessment and Plan





- Diagnosis


(1) Left lower lobe pneumonia


Qualifiers: 


   Pneumonia type: due to unspecified organism   Qualified Code(s): J18.9 - 

Pneumonia, unspecified organism   


Is this a current diagnosis for this admission?: Yes   


Plan: 


Bloodcultures pending.


Sputum cultures pending 


Influenza negative.


COVID19 pending





Patient is provided supplemental oxygen as needed maintain saturations greater 

than 89%.


Patient is empirically placed on IV azithromycin and ceftriaxone for coverage of

community-acquired pneumonia


He is placed on scheduled and as needed albuterol MDI; will avoid nebulizer 

secondary to COVID r/o


Start daily increase


Mucinex twice daily


He is placed on Robitussin as needed.


Pulmonary toilet is encouraged with incentive spirometer, flutter valve, early 

ambulation.








(2) COPD exacerbation


Is this a current diagnosis for this admission?: Yes   


Plan: 


In addition to the above, patient is placed on p.o. prednisone.








(3) Suspected COVID-19 virus infection


Is this a current diagnosis for this admission?: Yes   


Plan: 


CXR shows dense left lobe PNA; typical for bacterial


Influenza negative.


COVID pending.


Lymph% 14.4


D-dimer 3.67; start on full dose Lovenox.





Ferritin 340





Start vitamin C, vitamin D, zinc, and melatonin supplementation.


Avoid aerosolizing treatments and procedures.


Contact and droplet precautions.








(4) Acute respiratory failure with hypoxia


Is this a current diagnosis for this admission?: Yes   


Plan: 


Secondary #1-3; complicated by heavy tobacco dependency.


Evaluation and management as above.








(5) Tobacco dependence


Is this a current diagnosis for this admission?: Yes   


Plan: 


Smoking cessation encouraged.


Nicotine replacement therapies provided.








- Time


Time Spent with patient: 35 or more minutes

## 2020-04-13 LAB
ABSOLUTE LYMPHOCYTES# (MANUAL): 1.4 10^3/UL (ref 0.5–4.7)
ABSOLUTE MONOCYTES # (MANUAL): 0.2 10^3/UL (ref 0.1–1.4)
ADD MANUAL DIFF: YES
ANION GAP SERPL CALC-SCNC: 5 MMOL/L (ref 5–19)
ANISOCYTOSIS BLD QL SMEAR: (no result)
APPEARANCE UR: (no result)
APTT PPP: YELLOW S
BASOPHILS NFR BLD MANUAL: 0 % (ref 0–2)
BILIRUB UR QL STRIP: NEGATIVE
BUN SERPL-MCNC: 39 MG/DL (ref 7–20)
CALCIUM: 8.1 MG/DL (ref 8.4–10.2)
CHLORIDE SERPL-SCNC: 105 MMOL/L (ref 98–107)
CO2 SERPL-SCNC: 23 MMOL/L (ref 22–30)
EOSINOPHIL NFR BLD MANUAL: 0 % (ref 0–6)
ERYTHROCYTE [DISTWIDTH] IN BLOOD BY AUTOMATED COUNT: 15 % (ref 11.5–14)
GLUCOSE SERPL-MCNC: 108 MG/DL (ref 75–110)
GLUCOSE UR STRIP-MCNC: NEGATIVE MG/DL
HCT VFR BLD CALC: 32.9 % (ref 37.9–51)
HGB BLD-MCNC: 11.3 G/DL (ref 13.5–17)
KETONES UR STRIP-MCNC: NEGATIVE MG/DL
MCH RBC QN AUTO: 29 PG (ref 27–33.4)
MCHC RBC AUTO-ENTMCNC: 34.2 G/DL (ref 32–36)
MCV RBC AUTO: 85 FL (ref 80–97)
MONOCYTES % (MANUAL): 3 % (ref 3–13)
NEUTS BAND NFR BLD MANUAL: 10 % (ref 3–5)
NITRITE UR QL STRIP: NEGATIVE
PH UR STRIP: 5 [PH] (ref 5–9)
PLATELET # BLD: 131 10^3/UL (ref 150–450)
PLATELET COMMENT: ADEQUATE
POLYCHROMASIA BLD QL SMEAR: (no result)
POTASSIUM SERPL-SCNC: 4.2 MMOL/L (ref 3.6–5)
PROT UR STRIP-MCNC: 30 MG/DL
RBC # BLD AUTO: 3.89 10^6/UL (ref 4.35–5.55)
SEGMENTED NEUTROPHILS % (MAN): 69 % (ref 42–78)
SP GR UR STRIP: 1.02
TOTAL CELLS COUNTED BLD: 100
UROBILINOGEN UR-MCNC: 2 MG/DL (ref ?–2)
VARIANT LYMPHS NFR BLD MANUAL: 18 % (ref 13–45)
WBC # BLD AUTO: 7.6 10^3/UL (ref 4–10.5)

## 2020-04-13 RX ADMIN — OXYCODONE HYDROCHLORIDE AND ACETAMINOPHEN SCH MG: 500 TABLET ORAL at 17:06

## 2020-04-13 RX ADMIN — ALBUTEROL SULFATE SCH PUFF: 90 AEROSOL, METERED RESPIRATORY (INHALATION) at 05:25

## 2020-04-13 RX ADMIN — NICOTINE SCH: 21 PATCH, EXTENDED RELEASE TOPICAL at 10:42

## 2020-04-13 RX ADMIN — AZITHROMYCIN MONOHYDRATE SCH MLS/HR: 500 INJECTION, POWDER, LYOPHILIZED, FOR SOLUTION INTRAVENOUS at 11:28

## 2020-04-13 RX ADMIN — GUAIFENESIN SCH MG: 600 TABLET, EXTENDED RELEASE ORAL at 21:50

## 2020-04-13 RX ADMIN — ALBUTEROL SULFATE SCH PUFF: 90 AEROSOL, METERED RESPIRATORY (INHALATION) at 11:28

## 2020-04-13 RX ADMIN — ENOXAPARIN SODIUM SCH MG: 80 INJECTION SUBCUTANEOUS at 09:29

## 2020-04-13 RX ADMIN — CEFTRIAXONE SCH MLS/HR: 1 INJECTION, SOLUTION INTRAVENOUS at 09:29

## 2020-04-13 RX ADMIN — LIDOCAINE SCH: 50 PATCH CUTANEOUS at 12:39

## 2020-04-13 RX ADMIN — OXYCODONE HYDROCHLORIDE AND ACETAMINOPHEN SCH MG: 500 TABLET ORAL at 09:30

## 2020-04-13 RX ADMIN — SODIUM CHLORIDE PRN MLS/HR: 9 INJECTION, SOLUTION INTRAVENOUS at 06:21

## 2020-04-13 RX ADMIN — UMECLIDINIUM SCH INHALER: 62.5 AEROSOL, POWDER ORAL at 09:34

## 2020-04-13 RX ADMIN — ALBUTEROL SULFATE SCH PUFF: 90 AEROSOL, METERED RESPIRATORY (INHALATION) at 17:06

## 2020-04-13 RX ADMIN — Medication SCH MG: at 09:30

## 2020-04-13 RX ADMIN — MELATONIN SCH MG: 3 TAB ORAL at 21:50

## 2020-04-13 RX ADMIN — FAMOTIDINE SCH MG: 20 TABLET, FILM COATED ORAL at 09:30

## 2020-04-13 RX ADMIN — VITAMIN D, TAB 1000IU (100/BT) SCH UNIT: 25 TAB at 09:30

## 2020-04-13 RX ADMIN — ENOXAPARIN SODIUM SCH: 80 INJECTION SUBCUTANEOUS at 21:55

## 2020-04-13 RX ADMIN — ALBUTEROL SULFATE SCH PUFF: 90 AEROSOL, METERED RESPIRATORY (INHALATION) at 01:54

## 2020-04-13 RX ADMIN — SODIUM CHLORIDE PRN MLS/HR: 9 INJECTION, SOLUTION INTRAVENOUS at 17:05

## 2020-04-13 RX ADMIN — Medication SCH ML: at 21:51

## 2020-04-13 RX ADMIN — Medication SCH ML: at 05:24

## 2020-04-13 RX ADMIN — Medication SCH: at 13:34

## 2020-04-13 RX ADMIN — GUAIFENESIN SCH MG: 600 TABLET, EXTENDED RELEASE ORAL at 09:30

## 2020-04-13 NOTE — PDOC PROGRESS REPORT
Subjective


Progress Note for:: 04/13/20


Subjective:: 


ELLYN NORWOOD is a 64 year old male with a past medical history of COPD and 

tobacco dependence with continuous use who was admitted  4/12/20 with LLL PNA.





Patient was seen on morning rounds.  He was found resting in bed, comfortably, 

on supplemental oxygen by nasal cannula at 3 L/min; he is not home O2 dependent.

 He reports slight dyspnea and productive cough, although, states this is 

improved compared to yesterday.  He denies fevers overnight.  Overall he is 

feeling quite better.  He has no new questions or concerns at this time.


He specifically denies fever, chest pain, palpitations, orthopnea, abdominal 

pain, nausea vomiting and diarrhea.


No concerns per nursing.


Reason For Visit: 


ACUTE RESPIRATORY FAILURE WITH HYPOXIA,PNEUMONIA








Physical Exam


Vital Signs: 


                                        











Temp Pulse Resp BP Pulse Ox


 


 99.4 F   77   25 H  106/53 L  99 


 


 04/13/20 11:56  04/13/20 11:56  04/13/20 11:56  04/13/20 11:56  04/13/20 11:56








                                 Intake & Output











 04/12/20 04/13/20 04/14/20





 06:59 06:59 06:59


 


Intake Total  3120 300


 


Output Total  350 


 


Balance  2770 300


 


Weight  61.4 kg 











General appearance: PRESENT: no acute distress, cooperative, thin, well-

developed, well-nourished


Head exam: PRESENT: atraumatic, normocephalic


Eye exam: PRESENT: conjunctiva pink, EOMI, PERRLA.  ABSENT: scleral icterus


Ear exam: PRESENT: normal external ear exam


Mouth exam: PRESENT: moist, tongue midline


Teeth exam: PRESENT: poor dentation


Respiratory exam: PRESENT: decreased breath sounds - L>R, rhonchi, symmetrical, 

unlabored, other.  ABSENT: rales, wheezes


Cardiovascular exam: PRESENT: RRR.  ABSENT: diastolic murmur, rubs, systolic 

murmur


Pulses: PRESENT: normal dorsalis pedis pul


Vascular exam: PRESENT: normal capillary refill


GI/Abdominal exam: PRESENT: normal bowel sounds, soft.  ABSENT: distended, gu

arding, mass, organolmegaly, rebound, tenderness


Rectal exam: PRESENT: deferred


Extremities exam: PRESENT: full ROM.  ABSENT: calf tenderness, clubbing, pedal 

edema


Musculoskeletal exam: PRESENT: ambulatory


Neurological exam: PRESENT: alert, awake, oriented to person, oriented to place,

oriented to time, oriented to situation, CN II-XII grossly intact.  ABSENT: 

motor sensory deficit


Psychiatric exam: PRESENT: appropriate affect, normal mood.  ABSENT: homicidal 

ideation, suicidal ideation


Skin exam: PRESENT: dry, intact, warm.  ABSENT: cyanosis, rash





Results


Laboratory Results: 


                                        





                                 04/13/20 05:55 





                                 04/13/20 05:55 





                                        











  04/12/20 04/12/20 04/13/20





  11:21 16:10 05:55


 


WBC    7.6


 


RBC    3.89 L


 


Hgb    11.3 L D


 


Hct    32.9 L


 


MCV    85


 


MCH    29.0


 


MCHC    34.2


 


RDW    15.0 H


 


Plt Count    131 L


 


Seg Neutrophils %    Not Reportable


 


Sodium   


 


Potassium   


 


Chloride   


 


Carbon Dioxide   


 


Anion Gap   


 


BUN   


 


Creatinine   


 


Est GFR (African Amer)   


 


Glucose   


 


Calcium   


 


Ferritin  340.00  


 


C-Reactive Protein  264.2 H  


 


Urine Color   YELLOW 


 


Urine Appearance   SLIGHTLY-CLOUDY 


 


Urine pH   5.0 


 


Ur Specific Gravity   1.019 


 


Urine Protein   30 H 


 


Urine Glucose (UA)   NEGATIVE 


 


Urine Ketones   NEGATIVE 


 


Urine Blood   SMALL H 


 


Urine Nitrite   NEGATIVE 


 


Ur Leukocyte Esterase   NEGATIVE 


 


Urine WBC (Auto)   2 


 


Urine RBC (Auto)   0 














  04/13/20 04/13/20





  05:55 07:50


 


WBC  


 


RBC  


 


Hgb  


 


Hct  


 


MCV  


 


MCH  


 


MCHC  


 


RDW  


 


Plt Count  


 


Seg Neutrophils %  


 


Sodium  133.1 L 


 


Potassium  4.2  D 


 


Chloride  105 


 


Carbon Dioxide  23 


 


Anion Gap  5 


 


BUN  39 H 


 


Creatinine  1.85 H 


 


Est GFR (African Amer)  45 L 


 


Glucose  108 


 


Calcium  8.1 L 


 


Ferritin  


 


C-Reactive Protein  


 


Urine Color   YELLOW


 


Urine Appearance   SLIGHTLY-CLOUDY


 


Urine pH   5.0


 


Ur Specific Gravity   1.018


 


Urine Protein   30 H


 


Urine Glucose (UA)   NEGATIVE


 


Urine Ketones   NEGATIVE


 


Urine Blood   MODERATE H


 


Urine Nitrite   NEGATIVE


 


Ur Leukocyte Esterase   NEGATIVE


 


Urine WBC (Auto)   1


 


Urine RBC (Auto)  








                                        











  04/12/20 04/12/20





  11:21 11:21


 


Creatine Kinase  344 H 


 


CK-MB (CK-2)   0.75


 


Troponin I   0.012


 


NT-Pro-B Natriuret Pep   508 H











Impressions: 


                                        





Chest X-Ray  04/12/20 11:23


IMPRESSION:  Left lower lobe pneumonia.


 














Assessment and Plan





- Diagnosis


(1) Left lower lobe pneumonia


Qualifiers: 


   Pneumonia type: due to unspecified organism   Qualified Code(s): J18.9 - 

Pneumonia, unspecified organism   


Is this a current diagnosis for this admission?: Yes   


Plan: 


Improved; Leukocytosis is resolved, decreased oxygen needs, improved clinical 

appearance.


T-max 102.5 overnight.


Blood cultures NTD


Sputum cultures pending 


Influenza negative.


COVID19 pending





Patient is provided supplemental oxygen as needed maintain saturations greater 

than 89%.


Patient is empirically placed on IV azithromycin and ceftriaxone for coverage of

community-acquired pneumonia


He is placed on scheduled and as needed albuterol MDI; will avoid nebulizer 

secondary to COVID r/o


Start daily increase


Mucinex twice daily


He is placed on Robitussin as needed.


Pulmonary toilet is encouraged with incentive spirometer, flutter valve, early 

ambulation.








(2) COPD exacerbation


Is this a current diagnosis for this admission?: Yes   


Plan: 


In addition to the above, patient is placed on p.o. prednisone.








(3) Suspected COVID-19 virus infection


Is this a current diagnosis for this admission?: Yes   


Plan: 


CXR shows dense left lobe PNA; typical for bacterial


Influenza negative.


COVID pending.


Lymph% 14.4


D-dimer 3.67; start on full dose Lovenox.





Ferritin 340





Start vitamin C, vitamin D, zinc, and melatonin supplementation.


Full dose Lovenox.


Avoid aerosolizing treatments and procedures.


Contact and droplet precautions.








(4) Acute respiratory failure with hypoxia


Is this a current diagnosis for this admission?: Yes   


Plan: 


Secondary #1-3; complicated by heavy tobacco dependency.


Evaluation and management as above.








(5) Tobacco dependence


Is this a current diagnosis for this admission?: Yes   


Plan: 


Smoking cessation encouraged.


Nicotine replacement therapies provided.








(6) Alcohol use


Is this a current diagnosis for this admission?: Yes   


Plan: 


Nursing reports that the patient urinated in the trash can last night; no other 

evidence of disorientation/withdrawal.


We will start on Folic acid and thiamine.


PRN Ativan as needed for withdrawal symptoms.








(7) SOUTH (acute kidney injury)


Is this a current diagnosis for this admission?: Yes   


Plan: 


Improved.


The secondary to dehydration in setting of fever, tachypnea, respiratory 

distress, poor p.o. intake.


Patient was admitted with a creatinine/BUN of 2.21/37; baseline 1.6





Received 1 L normal saline bolus followed by IV fluids.


We will continue IV fluids.


Encourage p.o. intake.


Avoid nephrotoxic medications as able.


Follow-up chemistry.








- Time


Time Spent with patient: 35 or more minutes


Medications reviewed and adjusted accordingly: Yes


Anticipated discharge: Home


Within: within 72 hours

## 2020-04-14 LAB
ANION GAP SERPL CALC-SCNC: 4 MMOL/L (ref 5–19)
BUN SERPL-MCNC: 27 MG/DL (ref 7–20)
CALCIUM: 7.9 MG/DL (ref 8.4–10.2)
CHLORIDE SERPL-SCNC: 109 MMOL/L (ref 98–107)
CO2 SERPL-SCNC: 23 MMOL/L (ref 22–30)
ERYTHROCYTE [DISTWIDTH] IN BLOOD BY AUTOMATED COUNT: 14.9 % (ref 11.5–14)
GLUCOSE SERPL-MCNC: 102 MG/DL (ref 75–110)
HCT VFR BLD CALC: 30.6 % (ref 37.9–51)
HGB BLD-MCNC: 10.5 G/DL (ref 13.5–17)
MCH RBC QN AUTO: 29 PG (ref 27–33.4)
MCHC RBC AUTO-ENTMCNC: 34.2 G/DL (ref 32–36)
MCV RBC AUTO: 85 FL (ref 80–97)
PLATELET # BLD: 117 10^3/UL (ref 150–450)
POTASSIUM SERPL-SCNC: 3.8 MMOL/L (ref 3.6–5)
RBC # BLD AUTO: 3.61 10^6/UL (ref 4.35–5.55)
WBC # BLD AUTO: 6.6 10^3/UL (ref 4–10.5)

## 2020-04-14 RX ADMIN — SODIUM CHLORIDE PRN MLS/HR: 9 INJECTION, SOLUTION INTRAVENOUS at 05:28

## 2020-04-14 RX ADMIN — OXYCODONE HYDROCHLORIDE AND ACETAMINOPHEN SCH MG: 500 TABLET ORAL at 17:47

## 2020-04-14 RX ADMIN — ALBUTEROL SULFATE SCH PUFF: 90 AEROSOL, METERED RESPIRATORY (INHALATION) at 17:47

## 2020-04-14 RX ADMIN — SODIUM CHLORIDE PRN MLS/HR: 9 INJECTION, SOLUTION INTRAVENOUS at 15:45

## 2020-04-14 RX ADMIN — Medication SCH: at 13:45

## 2020-04-14 RX ADMIN — LIDOCAINE SCH: 50 PATCH CUTANEOUS at 09:11

## 2020-04-14 RX ADMIN — NICOTINE SCH: 21 PATCH, EXTENDED RELEASE TOPICAL at 09:16

## 2020-04-14 RX ADMIN — MELATONIN SCH MG: 3 TAB ORAL at 22:37

## 2020-04-14 RX ADMIN — Medication SCH MG: at 09:20

## 2020-04-14 RX ADMIN — ALBUTEROL SULFATE SCH PUFF: 90 AEROSOL, METERED RESPIRATORY (INHALATION) at 00:17

## 2020-04-14 RX ADMIN — OXYCODONE HYDROCHLORIDE AND ACETAMINOPHEN SCH MG: 500 TABLET ORAL at 09:21

## 2020-04-14 RX ADMIN — ALBUTEROL SULFATE SCH: 90 AEROSOL, METERED RESPIRATORY (INHALATION) at 15:26

## 2020-04-14 RX ADMIN — CEFTRIAXONE SCH MLS/HR: 1 INJECTION, SOLUTION INTRAVENOUS at 09:21

## 2020-04-14 RX ADMIN — FOLIC ACID SCH MG: 1 TABLET ORAL at 09:21

## 2020-04-14 RX ADMIN — FAMOTIDINE SCH MG: 20 TABLET, FILM COATED ORAL at 09:21

## 2020-04-14 RX ADMIN — AZITHROMYCIN MONOHYDRATE SCH MLS/HR: 500 INJECTION, POWDER, LYOPHILIZED, FOR SOLUTION INTRAVENOUS at 09:21

## 2020-04-14 RX ADMIN — GUAIFENESIN SCH MG: 600 TABLET, EXTENDED RELEASE ORAL at 22:37

## 2020-04-14 RX ADMIN — VITAMIN D, TAB 1000IU (100/BT) SCH UNIT: 25 TAB at 09:21

## 2020-04-14 RX ADMIN — Medication SCH ML: at 22:38

## 2020-04-14 RX ADMIN — UMECLIDINIUM SCH INHALER: 62.5 AEROSOL, POWDER ORAL at 09:20

## 2020-04-14 RX ADMIN — ENOXAPARIN SODIUM SCH: 80 INJECTION SUBCUTANEOUS at 09:15

## 2020-04-14 RX ADMIN — GUAIFENESIN SCH MG: 600 TABLET, EXTENDED RELEASE ORAL at 09:20

## 2020-04-14 RX ADMIN — ACETAMINOPHEN PRN MG: 325 TABLET ORAL at 00:24

## 2020-04-14 RX ADMIN — ALBUTEROL SULFATE SCH PUFF: 90 AEROSOL, METERED RESPIRATORY (INHALATION) at 05:30

## 2020-04-14 RX ADMIN — ENOXAPARIN SODIUM SCH MG: 80 INJECTION SUBCUTANEOUS at 22:36

## 2020-04-14 RX ADMIN — Medication SCH: at 05:30

## 2020-04-14 NOTE — PDOC PROGRESS REPORT
Subjective


Progress Note for:: 04/14/20


Subjective:: 





The patient is resting comfortably in bed on room air.  He states that he does 

get somewhat short of breath when walking to the bathroom.  He reports feeling 

better today.


Reason For Visit: 


ACUTE RESPIRATORY FAILURE WITH HYPOXIA,PNEUMONIA








Physical Exam


Vital Signs: 


                                        











Temp Pulse Resp BP Pulse Ox


 


 97.6 F   63   20   109/60   100 


 


 04/14/20 09:31  04/14/20 09:31  04/14/20 09:31  04/14/20 09:31  04/14/20 09:31








                                 Intake & Output











 04/13/20 04/14/20 04/15/20





 06:59 06:59 06:59


 


Intake Total 3120 3536 300


 


Output Total 350 2200 


 


Balance 2770 1336 300


 


Weight 61.4 kg 65.2 kg 











General appearance: PRESENT: no acute distress, cooperative, well-developed


Head exam: PRESENT: atraumatic, normocephalic


Eye exam: PRESENT: conjunctiva pink.  ABSENT: scleral icterus


Ear exam: PRESENT: normal external ear exam.  ABSENT: bleeding, drainage


Respiratory exam: PRESENT: clear to auscultation sukhdev, symmetrical, unlabored.  

ABSENT: accessory muscle use, rales, rhonchi, tachypnea, wheezes


Cardiovascular exam: PRESENT: RRR, +S1, +S2


GI/Abdominal exam: PRESENT: normal bowel sounds, soft.  ABSENT: distended, 

tenderness


Rectal exam: PRESENT: deferred


Gentrourinary exam: ABSENT: indwelling catheter


Extremities exam: ABSENT: pedal edema


Musculoskeletal exam: PRESENT: ambulatory, full ROM, normal inspection.  ABSENT:

deformity


Neurological exam: PRESENT: alert, awake, oriented to person, oriented to place,

oriented to time, oriented to situation, CN II-XII grossly intact.  ABSENT: 

altered


Psychiatric exam: PRESENT: flat affect.  ABSENT: agitated, anxious


Skin exam: PRESENT: dry, normal color, warm.  ABSENT: rash





Results


Laboratory Results: 


                                        





                                 04/14/20 04:14 





                                 04/14/20 04:14 





                                        











  04/14/20 04/14/20





  04:14 04:14


 


WBC  6.6 


 


RBC  3.61 L 


 


Hgb  10.5 L 


 


Hct  30.6 L 


 


MCV  85 


 


MCH  29.0 


 


MCHC  34.2 


 


RDW  14.9 H 


 


Plt Count  117 L 


 


Sodium   136.2 L


 


Potassium   3.8


 


Chloride   109 H


 


Carbon Dioxide   23


 


Anion Gap   4 L


 


BUN   27 H


 


Creatinine   1.59 H


 


Est GFR (African Amer)   54 L


 


Glucose   102


 


Calcium   7.9 L








                                        











  04/12/20 04/12/20





  11:21 11:21


 


Creatine Kinase  344 H 


 


CK-MB (CK-2)   0.75


 


Troponin I   0.012


 


NT-Pro-B Natriuret Pep   508 H











Impressions: 


                                        





Chest X-Ray  04/12/20 11:23


IMPRESSION:  Left lower lobe pneumonia.


 














Assessment and Plan





- Diagnosis


(1) Left lower lobe pneumonia


Qualifiers: 


   Pneumonia type: due to unspecified organism   Qualified Code(s): J18.9 - Pn

eumonia, unspecified organism   


Is this a current diagnosis for this admission?: Yes   


Plan: 


Improved; Leukocytosis is resolved, decreased oxygen needs, improved clinical 

appearance.


T-max 102.5 overnight.


Blood cultures NTD


Sputum cultures pending 


Influenza negative.


COVID19 pending





Patient is provided supplemental oxygen as needed maintain saturations greater 

than 89%.


Patient is empirically placed on IV azithromycin and ceftriaxone for coverage of

community-acquired pneumonia


He is placed on scheduled and as needed albuterol MDI; will avoid nebulizer 

secondary to COVID r/o


Start daily increase


Mucinex twice daily


He is placed on Robitussin as needed.


Pulmonary toilet is encouraged with incentive spirometer, flutter valve, early 

ambulation.





4/14/2020


Improved on current regimen.  Continue antibiotics.  Covid-19 negative.








(2) COPD exacerbation


Is this a current diagnosis for this admission?: Yes   


Plan: 


In addition to the above, patient is placed on p.o. prednisone.





4/14/2020


Improved.  Back to room air.  Continue inhaler regimen.








(3) SOUTH (acute kidney injury)


Is this a current diagnosis for this admission?: Yes   


Plan: 


Improved.


The secondary to dehydration in setting of fever, tachypnea, respiratory 

distress, poor p.o. intake.


Patient was admitted with a creatinine/BUN of 2.21/37; baseline 1.6





Received 1 L normal saline bolus followed by IV fluids.


We will continue IV fluids.


Encourage p.o. intake.


Avoid nephrotoxic medications as able.


Follow-up chemistry.





4/14/2020


Slowly improving.  Continue current regimen and monitor BUN and creatinine.  

Encourage p.o. fluids.








(4) Acute respiratory failure with hypoxia


Is this a current diagnosis for this admission?: Yes   


Plan: 


Secondary #1-3; complicated by heavy tobacco dependency.


Evaluation and management as above.


4/14/2020


Continue current regimen.  Already back on room air.








(5) Alcohol use


Is this a current diagnosis for this admission?: Yes   


Plan: 


Nursing reports that the patient urinated in the trash can last night; no other 

evidence of disorientation/withdrawal.


We will start on Folic acid and thiamine.


PRN Ativan as needed for withdrawal symptoms.





4/14/2020


No evidence of withdrawal at this time.








(6) Tobacco dependence


Is this a current diagnosis for this admission?: Yes   


Plan: 


Smoking cessation encouraged.


Nicotine replacement therapies provided.








(7) Suspected COVID-19 virus infection


Is this a current diagnosis for this admission?: Yes   


Plan: 


CXR shows dense left lobe PNA; typical for bacterial


Influenza negative.


COVID pending.


Lymph% 14.4


D-dimer 3.67; start on full dose Lovenox.





Ferritin 340





Start vitamin C, vitamin D, zinc, and melatonin supplementation.


Full dose Lovenox.


Avoid aerosolizing treatments and procedures.


Contact and droplet precautions.





4/14/2020


COVID serology is negative.








- Time


Time Spent with patient: 15-24 minutes


Medications reviewed and adjusted accordingly: Yes


Anticipated discharge: Home

## 2020-04-15 VITALS — DIASTOLIC BLOOD PRESSURE: 68 MMHG | SYSTOLIC BLOOD PRESSURE: 130 MMHG

## 2020-04-15 LAB
APPEARANCE UR: CLEAR
APTT PPP: YELLOW S
BILIRUB UR QL STRIP: NEGATIVE
ERYTHROCYTE [DISTWIDTH] IN BLOOD BY AUTOMATED COUNT: 14.8 % (ref 11.5–14)
GLUCOSE UR STRIP-MCNC: NEGATIVE MG/DL
HCT VFR BLD CALC: 31.6 % (ref 37.9–51)
HGB BLD-MCNC: 10.7 G/DL (ref 13.5–17)
KETONES UR STRIP-MCNC: NEGATIVE MG/DL
MCH RBC QN AUTO: 28.9 PG (ref 27–33.4)
MCHC RBC AUTO-ENTMCNC: 33.8 G/DL (ref 32–36)
MCV RBC AUTO: 85 FL (ref 80–97)
NITRITE UR QL STRIP: NEGATIVE
PH UR STRIP: 5 [PH] (ref 5–9)
PLATELET # BLD: 178 10^3/UL (ref 150–450)
PROT UR STRIP-MCNC: NEGATIVE MG/DL
RBC # BLD AUTO: 3.71 10^6/UL (ref 4.35–5.55)
SP GR UR STRIP: 1.01
UROBILINOGEN UR-MCNC: NEGATIVE MG/DL (ref ?–2)
WBC # BLD AUTO: 3.3 10^3/UL (ref 4–10.5)

## 2020-04-15 RX ADMIN — ALBUTEROL SULFATE SCH: 90 AEROSOL, METERED RESPIRATORY (INHALATION) at 01:10

## 2020-04-15 RX ADMIN — Medication SCH MG: at 11:03

## 2020-04-15 RX ADMIN — ALBUTEROL SULFATE SCH PUFF: 90 AEROSOL, METERED RESPIRATORY (INHALATION) at 12:00

## 2020-04-15 RX ADMIN — UMECLIDINIUM SCH INHALER: 62.5 AEROSOL, POWDER ORAL at 11:05

## 2020-04-15 RX ADMIN — VITAMIN D, TAB 1000IU (100/BT) SCH UNIT: 25 TAB at 11:03

## 2020-04-15 RX ADMIN — FOLIC ACID SCH MG: 1 TABLET ORAL at 11:03

## 2020-04-15 RX ADMIN — FAMOTIDINE SCH MG: 20 TABLET, FILM COATED ORAL at 11:03

## 2020-04-15 RX ADMIN — NICOTINE SCH: 21 PATCH, EXTENDED RELEASE TOPICAL at 11:04

## 2020-04-15 RX ADMIN — AZITHROMYCIN MONOHYDRATE SCH MLS/HR: 500 INJECTION, POWDER, LYOPHILIZED, FOR SOLUTION INTRAVENOUS at 11:56

## 2020-04-15 RX ADMIN — SODIUM CHLORIDE PRN MLS/HR: 9 INJECTION, SOLUTION INTRAVENOUS at 04:03

## 2020-04-15 RX ADMIN — Medication SCH: at 06:23

## 2020-04-15 RX ADMIN — LIDOCAINE SCH: 50 PATCH CUTANEOUS at 11:04

## 2020-04-15 RX ADMIN — Medication SCH: at 12:03

## 2020-04-15 RX ADMIN — GUAIFENESIN SCH MG: 600 TABLET, EXTENDED RELEASE ORAL at 11:03

## 2020-04-15 RX ADMIN — ENOXAPARIN SODIUM SCH: 80 INJECTION SUBCUTANEOUS at 12:02

## 2020-04-15 RX ADMIN — Medication SCH: at 13:36

## 2020-04-15 RX ADMIN — ALBUTEROL SULFATE SCH PUFF: 90 AEROSOL, METERED RESPIRATORY (INHALATION) at 06:23

## 2020-04-15 RX ADMIN — CEFTRIAXONE SCH MLS/HR: 1 INJECTION, SOLUTION INTRAVENOUS at 11:03

## 2020-04-15 RX ADMIN — OXYCODONE HYDROCHLORIDE AND ACETAMINOPHEN SCH MG: 500 TABLET ORAL at 11:03

## 2020-04-15 NOTE — PDOC DISCHARGE SUMMARY
Impression





- Admit/DC Date/PCP


Admission Date/Primary Care Provider: 


  04/12/20 13:12





  [facility]





Discharge Date: 04/15/20





- Additional Information


Resuscitation Status: Full Code


Discharge Diet: Regular


Discharge Activity: Activity As Tolerated


Referrals: 


[facility] [[facility]] - Follow up as needed (Automated appt. system 

is unavailable at this time due to a system upgrade. )


Prescriptions: 


Umeclidinium Bromide [Incruse 62.5 Mcg Ellipta 7 Dose/Dpi] 1 inh IH DAILY #1 

inhaler


Lidocaine [Lidoderm 5% (700 mg) Transdermal Patch] 1 patch TP DAILY #30 

adh..patch


Guaifenesin [Mucinex Sr 600 mg Tablet.sa] 600 mg PO Q12 5 Days #10 tablet.sa


Nicotine [Nicoderm 21 mg/24 Hr Transderm Patch] 1 each TD DAILY #30 patch.td24


Thiamine HCl [Thiamine 100 mg Tablet] 100 mg PO DAILY #30 tablet


Albuterol Sulfate [Ventolin Hfa 8 gm Mdi] 2 puff IH Q4HP PRN #1 inhaler


 PRN Reason: 


Azithromycin [Zithromax Tri-Niko] 500 mg PO DAILY 2 Days #2 tablet


Home Medications: 








Albuterol Sulfate [Ventolin Hfa 8 gm Mdi] 2 puff IH Q4HP PRN #1 inhaler 04/15/20




Azithromycin [Zithromax Tri-Niko] 500 mg PO DAILY 2 Days #2 tablet 04/15/20 


Guaifenesin [Mucinex Sr 600 mg Tablet.sa] 600 mg PO Q12 5 Days #10 tablet.sa 

04/15/20 


Lidocaine [Lidoderm 5% (700 mg) Transdermal Patch] 1 patch TP DAILY #30 

adh..patch 04/15/20 


Nicotine [Nicoderm 21 mg/24 Hr Transderm Patch] 1 each TD DAILY #30 patch.td24 

04/15/20 


Thiamine HCl [Thiamine 100 mg Tablet] 100 mg PO DAILY #30 tablet 04/15/20 


Umeclidinium Bromide [Incruse 62.5 Mcg Ellipta 7 Dose/Dpi] 1 inh IH DAILY #1 

inhaler 04/15/20 











History of Present Illiness


History of Present Illness: 


Per H&P:


"ELLYN NORWOOD is a 64 year old male with a past medical history of COPD and 

tobacco dependence with continuous use who presented to the emergency department

today with a complaint of sudden onset of shortness of breath 3 days ago that 

has progressively worsened associated with subjective fevers and chills at home 

and a nonproductive cough.


Evaluation in the emergency department revealed Hypoxia on room air, tachypnea, 

but otherwise stable vital signs, mild leukocytosis of 11.6, acute kidney injury

evidenced by elevated creatinine, BUN, hyponatremia, hyperkalemia, proBNP of 508

with normal troponin.  Influenza was negative.  Chest x-ray demonstrated a left 

lower lobe infiltrate.


He was provided nebulizer treatments, Solu-Medrol, IV Rocephin and azithromycin,

and supplemental oxygen.


He is referred to the hospitalist service for admission and management of the 

above-stated complaints and findings."








Hospital Course


Hospital Course: 


Patient admitted with acute left lower lobe pneumonia and COPD exacerbation with

hypoxemic respiratory failure.  Coronavirus was suspected and this was ruled out

with testing.  Patient was weaned off supplemental oxygen and no longer required

it at discharge.  Patient was given IV azithromycin and ceftriaxone for 4 days. 

He was discharged on azithromycin for 2 additional days to complete treatment.  

He was counseled extensively on tobacco and alcohol cessation and he states he 

plans to stop both of these.  He will need to follow-up with PCP and 

pulmonology.  His lungs are chronically weak and I counseled him on staying home

during the current pandemic.














(1) Left lower lobe pneumonia


Improved; Leukocytosis is resolved, decreased oxygen needs, improved clinical 

appearance.


T-max 102.5 overnight.


Blood cultures NTD


Sputum cultures pending 


Influenza negative.


COVID19 neg


Patient is provided supplemental oxygen as needed maintain saturations greater 

than 89%.


Patient is empirically placed on IV azithromycin and ceftriaxone for coverage of

community-acquired pneumonia


He is placed on scheduled and as needed albuterol MDI; will avoid nebulizer 

secondary to COVID r/o


Start daily increase


Mucinex twice daily


He is placed on Robitussin as needed.


Pulmonary toilet is encouraged with incentive spirometer, flutter valve, early 

ambulation.


4/14/2020


Improved on current regimen.  Continue antibiotics.  Covid-19 negative.





(2) COPD exacerbation


In addition to the above, patient is placed on p.o. prednisone.


4/14/2020


Improved.  Back to room air.  Continue inhaler regimen.





(3) SOUTH (acute kidney injury)


Improved.


The secondary to dehydration in setting of fever, tachypnea, respiratory 

distress, poor p.o. intake.


Patient was admitted with a creatinine/BUN of 2.21/37; baseline 1.6


Received 1 L normal saline bolus followed by IV fluids.


We will continue IV fluids.


Encourage p.o. intake.


Avoid nephrotoxic medications as able.


Follow-up chemistry.


4/14/2020


Slowly improving.  Continue current regimen and monitor BUN and creatinine.  

Encourage p.o. fluids.





(4) Acute respiratory failure with hypoxia


Secondary #1-3; complicated by heavy tobacco dependency.


Evaluation and management as above.


4/14/2020


Continue current regimen.  Already back on room air.





(5) Alcohol use


Nursing reports that the patient urinated in the trash can last night; no other 

evidence of disorientation/withdrawal.


We will start on Folic acid and thiamine.


PRN Ativan as needed for withdrawal symptoms.


4/14/2020


No evidence of withdrawal at this time.





(6) Tobacco dependence


Smoking cessation encouraged.


Nicotine replacement therapies provided.





(7) Suspected COVID-19 virus infection


CXR shows dense left lobe PNA; typical for bacterial


Influenza negative.


COVID pending.


Lymph% 14.4


D-dimer 3.67; start on full dose Lovenox.





Ferritin 340


Start vitamin C, vitamin D, zinc, and melatonin supplementation.


Full dose Lovenox.


Avoid aerosolizing treatments and procedures.


Contact and droplet precautions.


4/14/2020


COVID serology is negative.





Physical Exam


Vital Signs: 


                                        











Temp Pulse Resp BP Pulse Ox


 


 98.5 F   59 L  18   122/77   99 


 


 04/15/20 15:33  04/15/20 15:33  04/15/20 15:33  04/15/20 15:33  04/15/20 15:33








                                 Intake & Output











 04/14/20 04/15/20 04/16/20





 06:59 06:59 06:59


 


Intake Total 3536 3820 660


 


Output Total 2200 925 300


 


Balance 1336 2895 360


 


Weight 65.2 kg 65.2 kg 











General appearance: PRESENT: no acute distress, well-developed, well-nourished


Head exam: PRESENT: atraumatic, normocephalic


Eye exam: PRESENT: conjunctiva pink


Mouth exam: PRESENT: moist


Respiratory exam: PRESENT: clear to auscultation sukhdev.  ABSENT: rales, rhonchi, 

wheezes


Cardiovascular exam: PRESENT: RRR.  ABSENT: diastolic murmur, rubs, systolic 

murmur


GI/Abdominal exam: PRESENT: normal bowel sounds, soft.  ABSENT: distended, 

guarding, mass, organolmegaly, rebound, tenderness


Neurological exam: PRESENT: alert, awake, oriented to person, oriented to place,

oriented to time, oriented to situation


Psychiatric exam: PRESENT: appropriate affect, normal mood


Skin exam: PRESENT: dry, intact, warm





Results


Laboratory Results: 


                                        











WBC  3.3 10^3/uL (4.0-10.5)  L  04/15/20  04:47    


 


RBC  3.71 10^6/uL (4.35-5.55)  L  04/15/20  04:47    


 


Hgb  10.7 g/dL (13.5-17.0)  L  04/15/20  04:47    


 


Hct  31.6 % (37.9-51.0)  L  04/15/20  04:47    


 


MCV  85 fl (80-97)   04/15/20  04:47    


 


MCH  28.9 pg (27.0-33.4)   04/15/20  04:47    


 


MCHC  33.8 g/dL (32.0-36.0)   04/15/20  04:47    


 


RDW  14.8 % (11.5-14.0)  H  04/15/20  04:47    


 


Plt Count  178 10^3/uL (150-450)   04/15/20  04:47    


 


Lymph % (Auto)  Not Reportable   04/13/20  05:55    


 


Mono % (Auto)  Not Reportable   04/13/20  05:55    


 


Eos % (Auto)  Not Reportable   04/13/20  05:55    


 


Baso % (Auto)  Not Reportable   04/13/20  05:55    


 


Absolute Neuts (auto)  Not Reportable   04/13/20  05:55    


 


Absolute Lymphs (auto)  Not Reportable   04/13/20  05:55    


 


Absolute Monos (auto)  Not Reportable   04/13/20  05:55    


 


Absolute Eos (auto)  Not Reportable   04/13/20  05:55    


 


Absolute Basos (auto)  Not Reportable   04/13/20  05:55    


 


Total Counted  100   04/13/20  05:55    


 


Seg Neutrophils %  Not Reportable   04/13/20  05:55    


 


Seg Neuts % (Manual)  69 % (42-78)   04/13/20  05:55    


 


Band Neutrophils %  10 % (3-5)  H  04/13/20  05:55    


 


Lymphocytes % (Manual)  18 % (13-45)   04/13/20  05:55    


 


Monocytes % (Manual)  3 % (3-13)   04/13/20  05:55    


 


Eosinophils % (Manual)  0 % (0-6)   04/13/20  05:55    


 


Basophils % (Manual)  0 % (0-2)   04/13/20  05:55    


 


Abs Neuts (Manual)  6.0 10^3/uL (1.7-8.2)   04/13/20  05:55    


 


Abs Lymphs (Manual)  1.4 10^3/uL (0.5-4.7)   04/13/20  05:55    


 


Abs Monocytes (Manual)  0.2 10^3/uL (0.1-1.4)   04/13/20  05:55    


 


Absolute Eos (Manual)  0.0 10^3/uL (0.0-0.6)   04/13/20  05:55    


 


Abs Basophils (Manual)  0.0 10^3/uL (0.0-0.2)   04/13/20  05:55    


 


Platelet Comment  ADEQUATE   04/13/20  05:55    


 


Polychromasia  1+   04/13/20  05:55    


 


Anisocytosis  1+   04/13/20  05:55    


 


D-Dimer  3.67 ug/mL (0.00-0.50)  H  04/12/20  11:21    


 


Sodium  136.2 mmol/L (137-145)  L  04/14/20  04:14    


 


Potassium  3.8 mmol/L (3.6-5.0)   04/14/20  04:14    


 


Chloride  109 mmol/L ()  H  04/14/20  04:14    


 


Carbon Dioxide  23 mmol/L (22-30)   04/14/20  04:14    


 


Anion Gap  4  (5-19)  L  04/14/20  04:14    


 


BUN  27 mg/dL (7-20)  H  04/14/20  04:14    


 


Creatinine  1.59 mg/dL (0.52-1.25)  H  04/14/20  04:14    


 


Est GFR ( Amer)  54  (>60)  L  04/14/20  04:14    


 


Est GFR (MDRD) Non-Af  44  (>60)  L  04/14/20  04:14    


 


Glucose  102 mg/dL ()   04/14/20  04:14    


 


Calcium  7.9 mg/dL (8.4-10.2)  L  04/14/20  04:14    


 


Ferritin  340.00 ng/mL (17.9-464.0)   04/12/20  11:21    


 


Total Bilirubin  1.3 mg/dL (0.2-1.3)   04/12/20  11:21    


 


Direct Bilirubin  0.1 mg/dL (0.0-0.4)   04/12/20  11:21    


 


Neonat Total Bilirubin  Not Reportable   04/12/20  11:21    


 


Neonat Direct Bilirubin  Not Reportable   04/12/20  11:21    


 


Neonat Indirect Bili  Not Reportable   04/12/20  11:21    


 


AST  51 U/L (17-59)   04/12/20  11:21    


 


ALT  31 U/L (<50)   04/12/20  11:21    


 


Alkaline Phosphatase  70 U/L ()   04/12/20  11:21    


 


Creatine Kinase  344 U/L ()  H  04/12/20  11:21    


 


CK-MB (CK-2)  0.75 ng/mL (<4.55)   04/12/20  11:21    


 


Troponin I  0.012 ng/mL  04/12/20  11:21    


 


C-Reactive Protein  264.2 mg/L (<10.0)  H  04/12/20  11:21    


 


NT-Pro-B Natriuret Pep  508 pg/mL (<125)  H  04/12/20  11:21    


 


Total Protein  8.0 g/dL (6.3-8.2)   04/12/20  11:21    


 


Albumin  4.4 g/dL (3.5-5.0)   04/12/20  11:21    


 


Urine Color  YELLOW   04/15/20  10:00    


 


Urine Appearance  CLEAR   04/15/20  10:00    


 


Urine pH  5.0  (5.0-9.0)   04/15/20  10:00    


 


Ur Specific Gravity  1.015   04/15/20  10:00    


 


Urine Protein  NEGATIVE mg/dL (NEGATIVE)   04/15/20  10:00    


 


Urine Glucose (UA)  NEGATIVE mg/dL (NEGATIVE)   04/15/20  10:00    


 


Urine Ketones  NEGATIVE mg/dL (NEGATIVE)   04/15/20  10:00    


 


Urine Blood  NEGATIVE  (NEGATIVE)   04/15/20  10:00    


 


Urine Nitrite  NEGATIVE  (NEGATIVE)   04/15/20  10:00    


 


Urine Bilirubin  NEGATIVE  (NEGATIVE)   04/15/20  10:00    


 


Urine Urobilinogen  NEGATIVE mg/dL (<2.0)   04/15/20  10:00    


 


Ur Leukocyte Esterase  NEGATIVE  (NEGATIVE)   04/15/20  10:00    


 


Urine WBC (Auto)  1 /HPF  04/15/20  10:00    


 


Urine RBC (Auto)  0 /HPF  04/15/20  10:00    


 


U Hyaline Cast (Auto)  3 /LPF  04/13/20  07:50    


 


Squamous Epi Cells Auto  <1 /HPF  04/15/20  10:00    


 


Urine Mucus (Auto)  RARE /LPF  04/15/20  10:00    


 


Urine Ascorbic Acid  40  (NEGATIVE)  H  04/15/20  10:00    


 


COVID-19 Source  NASOPHARYNGEAL   04/12/20  14:08    


 


COVID-19 (CORDELIA)  NOT DETECTED   04/12/20  14:08    


 


Influenza A (Rapid)  NEGATIVE  (NEGATIVE)   04/12/20  12:48    


 


Influenza B (Rapid)  NEGATIVE  (NEGATIVE)   04/12/20  12:48    








                                        











  04/12/20





  11:21


 


CK-MB (CK-2)  0.75


 


Troponin I  0.012


 


NT-Pro-B Natriuret Pep  508 H











Impressions: 


                                        





Chest X-Ray  04/12/20 11:23


IMPRESSION:  Left lower lobe pneumonia.


 














Plan


Time Spent: Greater than 30 Minutes





Stroke


Is this a Stroke Patient?: No





Acute Heart Failure





- **


Is this a Heart Failure Patient?: No

## 2020-06-11 ENCOUNTER — HOSPITAL ENCOUNTER (EMERGENCY)
Dept: HOSPITAL 62 - ER | Age: 65
Discharge: HOME | End: 2020-06-11
Payer: OTHER GOVERNMENT

## 2020-06-11 VITALS — DIASTOLIC BLOOD PRESSURE: 74 MMHG | SYSTOLIC BLOOD PRESSURE: 130 MMHG

## 2020-06-11 DIAGNOSIS — F17.290: ICD-10-CM

## 2020-06-11 DIAGNOSIS — J90: ICD-10-CM

## 2020-06-11 DIAGNOSIS — Z79.899: ICD-10-CM

## 2020-06-11 DIAGNOSIS — R06.02: ICD-10-CM

## 2020-06-11 DIAGNOSIS — Z87.01: ICD-10-CM

## 2020-06-11 DIAGNOSIS — J43.9: Primary | ICD-10-CM

## 2020-06-11 LAB
ADD MANUAL DIFF: NO
ANION GAP SERPL CALC-SCNC: 4 MMOL/L (ref 5–19)
BASOPHILS # BLD AUTO: 0 10^3/UL (ref 0–0.2)
BASOPHILS NFR BLD AUTO: 0.8 % (ref 0–2)
BUN SERPL-MCNC: 24 MG/DL (ref 7–20)
CALCIUM: 9.6 MG/DL (ref 8.4–10.2)
CHLORIDE SERPL-SCNC: 106 MMOL/L (ref 98–107)
CO2 SERPL-SCNC: 29 MMOL/L (ref 22–30)
EOSINOPHIL # BLD AUTO: 0.6 10^3/UL (ref 0–0.6)
EOSINOPHIL NFR BLD AUTO: 13.1 % (ref 0–6)
ERYTHROCYTE [DISTWIDTH] IN BLOOD BY AUTOMATED COUNT: 15.5 % (ref 11.5–14)
GLUCOSE SERPL-MCNC: 94 MG/DL (ref 75–110)
HCT VFR BLD CALC: 40.6 % (ref 37.9–51)
HGB BLD-MCNC: 13.3 G/DL (ref 13.5–17)
LYMPHOCYTES # BLD AUTO: 1.3 10^3/UL (ref 0.5–4.7)
LYMPHOCYTES NFR BLD AUTO: 29.7 % (ref 13–45)
MCH RBC QN AUTO: 28.1 PG (ref 27–33.4)
MCHC RBC AUTO-ENTMCNC: 32.7 G/DL (ref 32–36)
MCV RBC AUTO: 86 FL (ref 80–97)
MONOCYTES # BLD AUTO: 0.5 10^3/UL (ref 0.1–1.4)
MONOCYTES NFR BLD AUTO: 12 % (ref 3–13)
NEUTROPHILS # BLD AUTO: 1.9 10^3/UL (ref 1.7–8.2)
NEUTS SEG NFR BLD AUTO: 44.4 % (ref 42–78)
PLATELET # BLD: 244 10^3/UL (ref 150–450)
POTASSIUM SERPL-SCNC: 5.2 MMOL/L (ref 3.6–5)
RBC # BLD AUTO: 4.74 10^6/UL (ref 4.35–5.55)
TOTAL CELLS COUNTED % (AUTO): 100 %
WBC # BLD AUTO: 4.3 10^3/UL (ref 4–10.5)

## 2020-06-11 PROCEDURE — 71260 CT THORAX DX C+: CPT

## 2020-06-11 PROCEDURE — 85025 COMPLETE CBC W/AUTO DIFF WBC: CPT

## 2020-06-11 PROCEDURE — 80048 BASIC METABOLIC PNL TOTAL CA: CPT

## 2020-06-11 PROCEDURE — 71045 X-RAY EXAM CHEST 1 VIEW: CPT

## 2020-06-11 PROCEDURE — 99284 EMERGENCY DEPT VISIT MOD MDM: CPT

## 2020-06-11 PROCEDURE — 36415 COLL VENOUS BLD VENIPUNCTURE: CPT

## 2020-06-11 NOTE — RADIOLOGY REPORT (SQ)
EXAM DESCRIPTION:  CHEST SINGLE VIEW



IMAGES COMPLETED DATE/TIME:  6/11/2020 11:07 am



REASON FOR STUDY:  cough



COMPARISON:  6/11/2020



EXAM PARAMETERS:  NUMBER OF VIEWS: One view.

TECHNIQUE: Single frontal radiographic view of the chest acquired.

RADIATION DOSE: NA

LIMITATIONS: None.



FINDINGS:  LUNGS AND PLEURA: There is a small left pleural effusion.  The right lung is clear.

MEDIASTINUM AND HILAR STRUCTURES: No masses.  Contour normal.

HEART AND VASCULAR STRUCTURES: Heart normal in size.  Normal vasculature.

BONES: No acute findings.

HARDWARE: None in the chest.

OTHER: No other significant finding.



IMPRESSION:  Left pleural effusion.



TECHNICAL DOCUMENTATION:  JOB ID:  [id number]

 2011 [employer]- All Rights Reserved



Reading location - IP/workstation name: [email or web address]

## 2020-06-11 NOTE — RADIOLOGY REPORT (SQ)
EXAM DESCRIPTION:  CT CHEST WITH



IMAGES COMPLETED DATE/TIME:  6/11/2020 12:31 pm



REASON FOR STUDY:  left pleural effusion, smoker



COMPARISON:  Chest x-ray 6/11/2020



TECHNIQUE:  CT scan of the chest performed using helical scanning technique with dynamic intravenous 
contrast injection.  Images reviewed with lung, soft tissue and bone windows.  Reconstructed coronal 
and sagittal MPR and MIP images reviewed.  All images stored on PACS.

All CT scanners at this facility use dose modulation, iterative reconstruction, and/or weight based d
osing when appropriate to reduce radiation dose to as low as reasonably achievable (ALARA).

[facility]: Dose Right  [facility]: CareDose    [facility]: Dose Right    [facility]: Teradose 4D    [facility]: Smart Technologies



CONTRAST TYPE AND DOSE:  80 cc Omnipaque 350- low osmolar.



RENAL FUNCTION:  BUN 24 creatinine 1.54



RADIATION DOSE:  CT Rad equipment meets quality standard of care and radiation dose reduction techniq
ues were employed. CTDIvol: 6.1 mGy. DLP: 314 mGy-cm. .



LIMITATIONS:  None.



FINDINGS:  LUNGS AND PLEURA: Centrilobular emphysematous changes.  Small left pleural effusion.  No m
ass.  No acute infiltrate.

HILAR AND MEDIASTINAL STRUCTURES: No identified masses or abnormal nodes.

HEART AND VASCULAR STRUCTURES: No aneurysm or dissection.  No central pulmonary emboli.  No pericardi
al effusion.

HARDWARE: None in the chest.

UPPER ABDOMEN: No significant findings.  Limited exam.

THYROID AND OTHER SOFT TISSUES: No masses.  No adenopathy.

BONES: No significant finding.

OTHER: No other significant finding.



IMPRESSION:  Centrilobular emphysema.  Small left pleural effusion.  No other significant finding.



TECHNICAL DOCUMENTATION:  JOB ID:  [id number]

Quality ID # [id number]: Final reports with documentation of one or more dose reduction techniques (e.g., Au
tomated exposure control, adjustment of the mA and/or kV according to patient size, use of iterative 
reconstruction technique)

 2011 [employer]- All Rights Reserved



Reading location - IP/workstation name: [email or web address]

## 2020-06-11 NOTE — ER DOCUMENT REPORT
ED General





- General


Chief Complaint: Shortness Of Breath


Stated Complaint: SHORTNESS OF BREATH


Time Seen by Provider: 06/11/20 10:28


Primary Care Provider: 


[facility] [[facility] - Follow up as needed


TRAVEL OUTSIDE OF THE [place] IN LAST 30 DAYS: No





- HPI


Notes: 





Chief complaint: Transient shortness of breath and productive cough





History of present illness: 65-year-old male with longstanding history of COPD 

and ongoing smoking of cigars now presents with transient shortness of breath ye

sterday during exertion shortly after smoking 2 cigars.  He says he is not 

smoked any since then.  He also reports that he is coughing up some yellow 

sputum.  He denies hemoptysis.  He denies chest pain.  He denies fever or 

chills.  Patient has been using metered-dose inhalers at home but apparently has

run out of his albuterol.





Patient was previously hospitalized here about 6 weeks ago for a left lower lobe

pneumonia and COPD exacerbation.  He was treated with Zithromax at that time and

experienced resolution of symptoms within several days of starting the 

antibiotic.  He was also briefly on steroids at that time but is no longer on 

steroids.  He does not use any oxygen at home.  [facility]





We note that the patient underwent COVID testing at the time of his recent 

hospitalization and the study was negative.








Current medications: 


Umeclidinium Bromide [Incruse 62.5 Mcg Ellipta 7 Dose/Dpi] 1 inh IH DAILY #1 

inhaler


Lidocaine [Lidoderm 5% (700 mg) Transdermal Patch] 1 patch TP DAILY #30 

adh..patch


Thiamine HCl [Thiamine 100 mg Tablet] 100 mg PO DAILY #30 tablet


Albuterol Sulfate [Ventolin Hfa 8 gm Mdi] 2 puff IH Q4HP PRN #1 inhaler


 Albuterol Sulfate [Ventolin Hfa 8 gm Mdi] 2 puff IH Q4HP PRN #1 inhaler 04/

15/20 




















- Related Data


Allergies/Adverse Reactions: 


                                        





No Known Allergies Allergy (Unverified 11/09/19 17:26)


   











Past Medical History





- General


Information source: Patient, [facility]





- Social History


Smoking Status: Current Every Day Smoker


Frequency of alcohol use: Social


Drug Abuse: None


Lives with: Family


Family History: Reviewed & Not Pertinent


Pulmonary Medical History: Reports: Hx Asthma, Hx COPD


Past Surgical History: Reports: Other - Eye surgery





- Immunizations


Hx Diphtheria, Pertussis, Tetanus Vaccination: No





Review of Systems





- Review of Systems


Notes: 





Constitutional: Negative for fever.


HENT: Negative for sore throat.


Eyes: Negative for visual changes.


Cardiovascular: Negative for chest pain.


Respiratory: As per HPI.


Gastrointestinal: Negative for abdominal pain, vomiting or diarrhea.


Genitourinary: Negative for dysuria.


Musculoskeletal: Negative for back pain.


Skin: Negative for rash.


Neurological: Negative for headaches, weakness or numbness.





10 point ROS negative except as marked above and in HPI.








Physical Exam





- Vital signs


Vitals: 


                                        











Temp Pulse BP Pulse Ox


 


 97.8 F   63   160/91 H  100 


 


 06/11/20 10:31  06/11/20 10:31  06/11/20 10:31  06/11/20 10:31














- Notes


Notes: 











GENERAL: Slender male approximately stated age appearing in no acute distress.





SKIN: Moderately dry.  Good turgor no rashes.





HEAD: Normocephalic atraumatic.





EYES: PERRLA.  EOMI.  Conjunctivae and sclerae clear.





EARS: CANALS AND TMS CLEAR.





NOSE: CLEAR.





MOUTH: Moist mucosa.  Good dentition.  No stridor or edema.  No drooling.





NECK: Supple.  No masses or thyromegaly.  No adenopathy.  Carotids 2+ without 

bruits.  No JVD.





BACK: Symmetrical without tenderness.





CHEST: Respirations unlabored.  Few faint end expiratory wheezes bilaterally.  

Breath sounds are symmetrical.





HEART: Regular rhythm.  No murmur gallop or rub.





ABDOMEN: Soft nontender without masses, organomegaly or rebound.  Bowel sounds 

normally active.  No bruits.





GENITALIA: Deferred.





EXTREMITIES: 3+ clubbing upper extremities.  No edema.  No calf tenderness.  Cap

refill less than 1.5 seconds.  Dorsalis pedis and posterior tibial pulses 3+ and

symmetrical.





NEUROLOGICAL: GCS 15.  Alert and oriented x3.  Normal gait.  Fluent speech.  

Cranial nerves II through XII intact.  Sensorimotor and cerebellar normal.  

Normal tone.





PSYCHIATRIC: Appropriate affect.





Course





- Re-evaluation


Re-evalutation: 





06/11/20 11:54


Patient is noted to have a new small left pleural effusion.  We note that he was

treated for pneumonia of the left lower lobe within the last 6 weeks.  This is 

probably a sympathetic effusion related to recent pneumonia.  He is however a 

heavy smoker and has ongoing COPD symptoms.  I am going to get a chest CT with 

contrast to be sure he does not have a developing neoplasm that is being 

overlooked.


06/11/20 13:25


CT of the chest with contrast reviewed by radiologist and shows evidence of a 

small left pleural effusion with no other obvious abnormalities.  I suspect this

is a sympathetic effusion related to the recent left lower lobe pneumonia.  I 

have encouraged this man to stop smoking.  I will send him home on a short 

course of prednisone and doxycycline and I have also refilled his Ventolin 

inhaler.  He needs to see his primary provider for a repeat chest x-ray within 

the next 10 to 14 days.





Findings, clinical impression and plan of treatment have been discussed with 

patient/family.  Understanding of current findings and recommendations has been 

acknowledged by them and there is agreement regarding disposition and follow-up.





- Vital Signs


Vital signs: 


                                        











Temp Pulse Resp BP Pulse Ox


 


 97.8 F   63      160/91 H  100 


 


 06/11/20 11:37  06/11/20 10:31     06/11/20 10:31  06/11/20 10:31














- Laboratory


Result Diagrams: 


                                 06/11/20 11:04





                                 06/11/20 11:04


Laboratory results interpreted by me: 


                                        











  06/11/20 06/11/20





  11:04 11:04


 


Hgb  13.3 L 


 


RDW  15.5 H 


 


Eos % (Auto)  13.1 H 


 


Potassium   5.2 H


 


Anion Gap   4 L


 


BUN   24 H


 


Creatinine   1.54 H


 


Est GFR ( Amer)   55 L


 


Est GFR (MDRD) Non-Af   46 L














Discharge





- Discharge


Clinical Impression: 


 Acute exacerbation of chronic obstructive pulmonary disease (COPD), Pleural 

effusion, left





Condition: Stable


Disposition: HOME, SELF-CARE


Additional Instructions: 


Stop smoking.  Take prescribed medications as directed.  [facility] for evaluation of fluid in your chest within

the next 10 to 14 days.





Return here as needed for new or worsening symptoms:





Pain that is worsening or unimproved


Uncontrolled vomiting


High fever or shaking chills


Overall worsening


Prescriptions: 


Prednisone [Deltasone 20 mg Tablet] 2 tab PO DAILY 5 Days  tablet


Albuterol Sulfate [Proair HFA Inhalation Aerosol 8.5 gm MDI] 2 puff IH Q4H PRN 

#1 mdi


 PRN Reason: 


Forms:  Smoking Cessation Education


Referrals: 


[facility] [[facility]] - Follow up as needed

## 2020-10-08 ENCOUNTER — HOSPITAL ENCOUNTER (EMERGENCY)
Dept: HOSPITAL 62 - ER | Age: 65
Discharge: HOME | End: 2020-10-08
Payer: OTHER GOVERNMENT

## 2020-10-08 VITALS — DIASTOLIC BLOOD PRESSURE: 81 MMHG | SYSTOLIC BLOOD PRESSURE: 132 MMHG

## 2020-10-08 DIAGNOSIS — Z87.01: ICD-10-CM

## 2020-10-08 DIAGNOSIS — R06.02: ICD-10-CM

## 2020-10-08 DIAGNOSIS — J44.1: Primary | ICD-10-CM

## 2020-10-08 DIAGNOSIS — F17.290: ICD-10-CM

## 2020-10-08 DIAGNOSIS — J90: ICD-10-CM

## 2020-10-08 DIAGNOSIS — R05: ICD-10-CM

## 2020-10-08 DIAGNOSIS — F12.10: ICD-10-CM

## 2020-10-08 DIAGNOSIS — Z79.899: ICD-10-CM

## 2020-10-08 DIAGNOSIS — F14.10: ICD-10-CM

## 2020-10-08 LAB
ADD MANUAL DIFF: NO
ALBUMIN SERPL-MCNC: 4.6 G/DL (ref 3.5–5)
ALP SERPL-CCNC: 77 U/L (ref 38–126)
ANION GAP SERPL CALC-SCNC: 7 MMOL/L (ref 5–19)
AST SERPL-CCNC: 44 U/L (ref 17–59)
BASOPHILS # BLD AUTO: 0.1 10^3/UL (ref 0–0.2)
BASOPHILS NFR BLD AUTO: 1 % (ref 0–2)
BILIRUB DIRECT SERPL-MCNC: 0.2 MG/DL (ref 0–0.4)
BILIRUB SERPL-MCNC: 0.8 MG/DL (ref 0.2–1.3)
BUN SERPL-MCNC: 16 MG/DL (ref 7–20)
CALCIUM: 9.6 MG/DL (ref 8.4–10.2)
CHLORIDE SERPL-SCNC: 102 MMOL/L (ref 98–107)
CO2 SERPL-SCNC: 31 MMOL/L (ref 22–30)
EOSINOPHIL # BLD AUTO: 0.7 10^3/UL (ref 0–0.6)
EOSINOPHIL NFR BLD AUTO: 12.6 % (ref 0–6)
ERYTHROCYTE [DISTWIDTH] IN BLOOD BY AUTOMATED COUNT: 14.8 % (ref 11.5–14)
GLUCOSE SERPL-MCNC: 94 MG/DL (ref 75–110)
HCT VFR BLD CALC: 43.4 % (ref 37.9–51)
HGB BLD-MCNC: 14.8 G/DL (ref 13.5–17)
LYMPHOCYTES # BLD AUTO: 1.6 10^3/UL (ref 0.5–4.7)
LYMPHOCYTES NFR BLD AUTO: 28.9 % (ref 13–45)
MCH RBC QN AUTO: 29.7 PG (ref 27–33.4)
MCHC RBC AUTO-ENTMCNC: 34.1 G/DL (ref 32–36)
MCV RBC AUTO: 87 FL (ref 80–97)
MONOCYTES # BLD AUTO: 0.6 10^3/UL (ref 0.1–1.4)
MONOCYTES NFR BLD AUTO: 10.8 % (ref 3–13)
NEUTROPHILS # BLD AUTO: 2.5 10^3/UL (ref 1.7–8.2)
NEUTS SEG NFR BLD AUTO: 46.7 % (ref 42–78)
NT PRO BNP: 97 PG/ML (ref ?–125)
PLATELET # BLD: 225 10^3/UL (ref 150–450)
POTASSIUM SERPL-SCNC: 4.7 MMOL/L (ref 3.6–5)
PROT SERPL-MCNC: 8.3 G/DL (ref 6.3–8.2)
RBC # BLD AUTO: 4.99 10^6/UL (ref 4.35–5.55)
TOTAL CELLS COUNTED % (AUTO): 100 %
TROPONIN I SERPL-MCNC: < 0.012 NG/ML
WBC # BLD AUTO: 5.4 10^3/UL (ref 4–10.5)

## 2020-10-08 PROCEDURE — 96375 TX/PRO/DX INJ NEW DRUG ADDON: CPT

## 2020-10-08 PROCEDURE — 83880 ASSAY OF NATRIURETIC PEPTIDE: CPT

## 2020-10-08 PROCEDURE — 96365 THER/PROPH/DIAG IV INF INIT: CPT

## 2020-10-08 PROCEDURE — 80053 COMPREHEN METABOLIC PANEL: CPT

## 2020-10-08 PROCEDURE — 71045 X-RAY EXAM CHEST 1 VIEW: CPT

## 2020-10-08 PROCEDURE — 93005 ELECTROCARDIOGRAM TRACING: CPT

## 2020-10-08 PROCEDURE — 93010 ELECTROCARDIOGRAM REPORT: CPT

## 2020-10-08 PROCEDURE — 36415 COLL VENOUS BLD VENIPUNCTURE: CPT

## 2020-10-08 PROCEDURE — 84484 ASSAY OF TROPONIN QUANT: CPT

## 2020-10-08 PROCEDURE — 99285 EMERGENCY DEPT VISIT HI MDM: CPT

## 2020-10-08 PROCEDURE — 94640 AIRWAY INHALATION TREATMENT: CPT

## 2020-10-08 PROCEDURE — 85025 COMPLETE CBC W/AUTO DIFF WBC: CPT

## 2020-10-08 NOTE — RADIOLOGY REPORT (SQ)
EXAM DESCRIPTION:  CHEST SINGLE VIEW



IMAGES COMPLETED DATE/TIME:  10/8/2020 11:37 am



REASON FOR STUDY:  sob



COMPARISON:  None.



EXAM PARAMETERS:  NUMBER OF VIEWS: One view.

TECHNIQUE: Single frontal radiographic view of the chest acquired.

RADIATION DOSE: NA

LIMITATIONS: None.



FINDINGS:  LUNGS AND PLEURA: No opacities, masses or pneumothorax. No pleural effusion.

MEDIASTINUM AND HILAR STRUCTURES: No masses.  Contour normal.

HEART AND VASCULAR STRUCTURES: Heart normal in size.  Normal vasculature.

BONES: No acute findings.

HARDWARE: None in the chest.

OTHER: No other significant finding.



IMPRESSION:  NO ACUTE RADIOGRAPHIC FINDING IN THE CHEST.



TECHNICAL DOCUMENTATION:  JOB ID:  [id number]

 2011 [employer]- All Rights Reserved



Reading location - IP/workstation name: [email or web address]

## 2020-10-08 NOTE — ER DOCUMENT REPORT
ED Medical Screen (RME)





- General


Chief Complaint: Breathing Difficulty


Stated Complaint: SHORT OF BREATH


Time Seen by Provider: 10/08/20 10:57


Primary Care Provider: 


ELEAZAR BARTHOLOMEW [Primary Care Provider] - Follow up as needed


Information source: Patient


Notes: 





Patient presents complaining of cough for the past week with shortness of 

breath.  Patient denies any chest pain.  Patient denies any fever, nausea or 

vomiting.  Patient does report a history of COPD and reports no improvement with

use of his inhaler at home.





I have greeted and performed a rapid initial assessment of this patient.  A 

comprehensive ED assessment and evaluation of the patient, analysis of test 

results and completion of the medical decision making process will be conducted 

by additional ED providers.


TRAVEL OUTSIDE OF THE [place] IN LAST 30 DAYS: No





- Related Data


Allergies/Adverse Reactions: 


                                        





No Known Allergies Allergy (Unverified 11/09/19 17:26)


   











Past Medical History


Pulmonary Medical History: Reports: Hx Asthma, Hx COPD


Past Surgical History: Reports: Other - Eye surgery





- Immunizations


Hx Diphtheria, Pertussis, Tetanus Vaccination: No





Physical Exam





- Respiratory


Respiratory status: No respiratory distress


Breath sounds: Nonproductive cough, Wheezing - Bilateral bases, left worse than 

right





Doctor's Discharge





- Discharge


Referrals: 


[facility] - Follow up as needed

## 2020-10-08 NOTE — ER DOCUMENT REPORT
ED General





- General


Chief Complaint: Shortness Of Breath


Stated Complaint: SHORT OF BREATH


Time Seen by Provider: 10/08/20 10:57


Primary Care Provider: 


ELEAZAR BARTHOLOMEW [Primary Care Provider] - Follow up as needed


TRAVEL OUTSIDE OF THE [place] IN LAST 30 DAYS: No





- HPI


Notes: 





ED General





- General


Chief Complaint: Shortness Of Breath


Stated Complaint: SHORTNESS OF BREATH


Time Seen by Provider: 06/11/20 10:28


Primary Care Provider: 


ELEAZAR BARTHOLOMEW [Primary Care Provider] - Follow up as needed


TRAVEL OUTSIDE OF THE [place] IN LAST 30 DAYS: No





- HPI


Notes: 





Chief complaint: Transient shortness of breath and productive cough





History of present illness: 65-year-old male with longstanding history of COPD 

and ongoing smoking of cigars now presents with transient shortness of breath 

yesterday during exertion shortly after smoking 2 cigars.  He says he is not 

smoked any since then.  He also reports that he is coughing up some yellow 

sputum.  He denies hemoptysis.  He denies chest pain.  He denies fever or 

chills.  Patient has been using metered-dose inhalers at home but apparently has

run out of his albuterol.





Patient was previously hospitalized here about 6 weeks ago for a left lower lobe

pneumonia and COPD exacerbation.  He was treated with Zithromax at that time and

experienced resolution of symptoms within several days of starting the 

antibiotic.  He was also briefly on steroids at that time but is no longer on 

steroids.  He does not use any oxygen at home.  [facility]





Patient was seen with identical presentation me in this emergency department 

about 6 weeks ago.  He continues to smoke a couple cigars per day.  He denies 

any recent COVID exposure.











Current medications: 


Umeclidinium Bromide [Incruse 62.5 Mcg Ellipta 7 Dose/Dpi] 1 inh IH DAILY #1 

inhaler


Lidocaine [Lidoderm 5% (700 mg) Transdermal Patch] 1 patch TP DAILY #30 

adh..patch


Thiamine HCl [Thiamine 100 mg Tablet] 100 mg PO DAILY #30 tablet


Albuterol Sulfate [Ventolin Hfa 8 gm Mdi] 2 puff IH Q4HP PRN #1 inhaler


 Albuterol Sulfate [Ventolin Hfa 8 gm Mdi] 2 puff IH Q4HP PRN #1 inhaler 

04/15/20 


























Review of Systems





- Review of Systems


Notes: 





Constitutional: Negative for fever.


HENT: Negative for sore throat.


Eyes: Negative for visual changes.


Cardiovascular: Negative for chest pain.


Respiratory: As per HPI.


Gastrointestinal: Negative for abdominal pain, vomiting or diarrhea.


Genitourinary: Negative for dysuria.


Musculoskeletal: Negative for back pain.


Skin: Negative for rash.


Neurological: Negative for headaches, weakness or numbness.





10 point ROS negative except as marked above and in HPI.








Physical Exam





- Vital signs


Vitals: 


                                        











Temp Pulse BP Pulse Ox


 


 97.8 F   63   160/91 H  100 


 


 06/11/20 10:31  06/11/20 10:31  06/11/20 10:31  06/11/20 10:31














- Notes


Notes: 











GENERAL: Slender male approximately stated age appearing in no acute distress.





SKIN: Moderately dry.  Good turgor no rashes.





HEAD: Normocephalic atraumatic.





EYES: PERRLA.  EOMI.  Conjunctivae and sclerae clear.





EARS: CANALS AND TMS CLEAR.





NOSE: CLEAR.





MOUTH: Moist mucosa.  Good dentition.  No stridor or edema.  No drooling.





NECK: Supple.  No masses or thyromegaly.  No adenopathy.  Carotids 2+ without 

bruits.  No JVD.





BACK: Symmetrical without tenderness.





CHEST: Respirations unlabored.  Few faint end expiratory wheezes bilaterally.  

Breath sounds are symmetrical.





HEART: Regular rhythm.  No murmur gallop or rub.





ABDOMEN: Soft nontender without masses, organomegaly or rebound.  Bowel sounds n

ormally active.  No bruits.





GENITALIA: Deferred.





EXTREMITIES: 3+ clubbing upper extremities.  No edema.  No calf tenderness.  Cap

refill less than 1.5 seconds.  Dorsalis pedis and posterior tibial pulses 3+ and

symmetrical.





NEUROLOGICAL: GCS 15.  Alert and oriented x3.  Normal gait.  Fluent speech.  

Cranial nerves II through XII intact.  Sensorimotor and cerebellar normal.  

Normal tone.





PSYCHIATRIC: Appropriate affect.





Course





- Re-evaluation


Re-evalutation: 





06/11/20 11:54


Patient is noted to have a new small left pleural effusion.  We note that he was

treated for pneumonia of the left lower lobe within the last 6 weeks.  This is 

probably a sympathetic effusion related to recent pneumonia.  He is however a 

heavy smoker and has ongoing COPD symptoms.  I am going to get a chest CT with 

contrast to be sure he does not have a developing neoplasm that is being 

overlooked.


06/11/20 13:25


CT of the chest with contrast reviewed by radiologist and shows evidence of a 

small left pleural effusion with no other obvious abnormalities.  I suspect this

is a sympathetic effusion related to the recent left lower lobe pneumonia.  I 

have encouraged this man to stop smoking.  I will send him home on a short 

course of prednisone and doxycycline and I have also refilled his Ventolin 

inhaler.  He needs to see his primary provider for a repeat chest x-ray within 

the next 10 to 14 days.





Findings, clinical impression and plan of treatment have been discussed with 

patient/family.  Understanding of current findings and recommendations has been 

acknowledged by them and there is agreement regarding disposition and follow-up.





- Vital Signs


Vital signs: 


                                        











Temp Pulse Resp BP Pulse Ox


 


 97.8 F   63      160/91 H  100 


 


 06/11/20 11:37  06/11/20 10:31     06/11/20 10:31  06/11/20 10:31














- Laboratory


Result Diagrams: 


                                 06/11/20 11:04





                                 06/11/20 11:04


Laboratory results interpreted by me: 


                                        











  06/11/20 06/11/20





  11:04 11:04


 


Hgb  13.3 L 


 


RDW  15.5 H 


 


Eos % (Auto)  13.1 H 


 


Potassium   5.2 H


 


Anion Gap   4 L


 


BUN   24 H


 


Creatinine   1.54 H


 


Est GFR ( Amer)   55 L


 


Est GFR (MDRD) Non-Af   46 L














Discharge





- Discharge


Clinical Impression: 


 Acute exacerbation of chronic obstructive pulmonary disease (COPD), Pleural 

effusion, left





Condition: Stable


Disposition: HOME, SELF-CARE


Additional Instructions: 


Stop smoking.  Take prescribed medications as directed.  [facility] for evaluation of fluid in your chest within

the next 10 to 14 days.





Return here as needed for new or worsening symptoms:





Pain that is worsening or unimproved


Uncontrolled vomiting


High fever or shaking chills


Overall worsening


Prescriptions: 


Prednisone [Deltasone 20 mg Tablet] 2 tab PO DAILY 5 Days  tablet


Albuterol Sulfate [Proair HFA Inhalation Aerosol 8.5 gm MDI] 2 puff IH Q4H PRN 

#1 mdi


 PRN Reason: 


Forms:  Smoking Cessation Education


Referrals: 


[facility] [[facility] - Follow up as needed








- Related Data


Allergies/Adverse Reactions: 


                                        





No Known Allergies Allergy (Unverified 11/09/19 17:26)


   











Past Medical History





- General


Information source: Patient, Critical access hospital Records





- Social History


Smoking Status: Current Every Day Smoker


Frequency of alcohol use: Social


Drug Abuse: Cocaine, Marijuana


Family History: Reviewed & Not Pertinent


Patient has homicidal ideation: No


Pulmonary Medical History: Reports: Hx Asthma, Hx COPD


Past Surgical History: Reports: Other - Eye surgery





- Immunizations


Hx Diphtheria, Pertussis, Tetanus Vaccination: No





Review of Systems





- Review of Systems


Notes: 





Review of Systems





- Review of Systems


Notes: 





Constitutional: Negative for fever.


HENT: Negative for sore throat.


Eyes: Negative for visual changes.


Cardiovascular: Negative for chest pain.


Respiratory: As per HPI.


Gastrointestinal: Negative for abdominal pain, vomiting or diarrhea.


Genitourinary: Negative for dysuria.


Musculoskeletal: Negative for back pain.


Skin: Negative for rash.


Neurological: Negative for headaches, weakness or numbness.





10 point ROS negative except as marked above and in HPI.














Physical Exam





- Vital signs


Vitals: 


                                        











Pulse Ox


 


 99 


 


 10/08/20 11:26














- Notes


Notes: 





Physical Exam











GENERAL: Slender male approximately stated age appearing in no acute distress.





SKIN: Moderately dry.  Good turgor no rashes.





HEAD: Normocephalic atraumatic.





EYES: PERRLA.  EOMI.  Conjunctivae and sclerae clear.





EARS: CANALS AND TMS CLEAR.





NOSE: CLEAR.





MOUTH: Moist mucosa.  Very poor dentition.  No stridor or edema.  No drooling.





NECK: Supple.  No masses or thyromegaly.  No adenopathy.  Carotids 2+ without 

bruits.  No JVD.





BACK: Symmetrical without tenderness.





CHEST: Respirations unlabored.  Few faint end expiratory wheezes bilaterally.  

Breath sounds are symmetrical.





HEART: Regular rhythm.  No murmur gallop or rub.





ABDOMEN: Soft nontender without masses, organomegaly or rebound.  Bowel sounds 

normally active.  No bruits.





GENITALIA: Deferred.





EXTREMITIES: 3+ clubbing upper extremities.  No edema.  No calf tenderness.  Cap

refill less than 1.5 seconds.  Dorsalis pedis and posterior tibial pulses 3+ and

symmetrical.





NEUROLOGICAL: GCS 15.  Alert and oriented x3.  Normal gait.  Fluent speech.  

Cranial nerves II through XII intact.  Sensorimotor and cerebellar normal.  

Normal tone.





PSYCHIATRIC: Appropriate affect.











Course





- Re-evaluation


Re-evalutation: 





10/08/20 14:14


Findings consistent with COPD exacerbation.  Patient was given IV Rocephin and 

IV Solu-Medrol.  He also received 2 DuoNeb treatments.  He is oxygenating 

normally is hemodynamically stable and afebrile.  X-ray shows no focal 

infiltrates.  He is run out of his metered-dose inhaler for albuterol and this 

will be refilled.  I have again advised him to stop smoking.  I think he is 

stable to follow-up with his [facility] physician as an outpatient.





Findings, clinical impression and plan of treatment have been discussed with 

patient/family.  Understanding of current findings and recommendations has been 

acknowledged by them and there is agreement regarding disposition and follow-up.





- Vital Signs


Vital signs: 


                                        











Temp Pulse Resp BP Pulse Ox


 


       21 H  131/81 H  100 


 


       10/08/20 12:18  10/08/20 12:18  10/08/20 12:18














- Laboratory


Result Diagrams: 


                                 10/08/20 12:26





                                 10/08/20 12:26


Laboratory results interpreted by me: 


                                        











  10/08/20 10/08/20





  12:26 12:26


 


RDW  14.8 H 


 


Eos % (Auto)  12.6 H 


 


Absolute Eos (auto)  0.7 H 


 


Carbon Dioxide   31 H


 


Creatinine   1.41 H


 


Est GFR (MDRD) Non-Af   51 L


 


Total Protein   8.3 H














- Diagnostic Test


Radiology reviewed: Reports reviewed - Per radiologist: Chest x-ray shows 

hyperinflation consistent with COPD.  No focal infiltrate.





Discharge





- Discharge


Clinical Impression: 


 COPD exacerbation





Condition: Stable


Disposition: HOME, SELF-CARE


Additional Instructions: 


Stop smoking.





Take prescribed medications.





Return here as needed for new or worsening symptoms:





Pain that is worsening or unimproved


Uncontrolled vomiting


High fever or shaking chills


Overall worsening





[facility]


Prescriptions: 


Prednisone [Deltasone 20 mg Tablet] 2 tab PO DAILY 5 Days  tablet


Doxycycline Monohydrate 100 mg PO BID #20 capsule


Albuterol Sulfate [Proair HFA Inhalation Aerosol 8.5 gm MDI] 2 puff IH Q4H PRN 

#1 mdi


 PRN Reason: 


Forms:  Smoking Cessation Education


Referrals: 


[facility] [[facility]] - Follow up as needed

## 2020-10-08 NOTE — EKG REPORT
SEVERITY:- ABNORMAL ECG -

SINUS RHYTHM

BIATRIAL ABNORMALITIES

BORDERLINE ST ELEVATION, ANTERIOR LEADS

:

Confirmed by: Bhavya Farris MD 08-Oct-2020 19:19:23

## 2020-10-08 NOTE — EKG REPORT
SEVERITY:- ABNORMAL ECG -

SINUS RHYTHM

BIATRIAL ABNORMALITIES

:

Confirmed by: Bhavya Farris MD 08-Oct-2020 19:19:18

## 2020-11-19 ENCOUNTER — HOSPITAL ENCOUNTER (EMERGENCY)
Dept: HOSPITAL 62 - ER | Age: 65
LOS: 1 days | Discharge: HOME | End: 2020-11-20
Payer: OTHER GOVERNMENT

## 2020-11-19 DIAGNOSIS — F17.200: ICD-10-CM

## 2020-11-19 DIAGNOSIS — Z79.899: ICD-10-CM

## 2020-11-19 DIAGNOSIS — F12.10: ICD-10-CM

## 2020-11-19 DIAGNOSIS — J44.1: Primary | ICD-10-CM

## 2020-11-19 DIAGNOSIS — I49.9: ICD-10-CM

## 2020-11-19 DIAGNOSIS — E83.41: ICD-10-CM

## 2020-11-19 LAB
ADD MANUAL DIFF: NO
ALBUMIN SERPL-MCNC: 4.6 G/DL (ref 3.5–5)
ALP SERPL-CCNC: 66 U/L (ref 38–126)
ANION GAP SERPL CALC-SCNC: 7 MMOL/L (ref 5–19)
AST SERPL-CCNC: 37 U/L (ref 17–59)
BASOPHILS # BLD AUTO: 0 10^3/UL (ref 0–0.2)
BASOPHILS NFR BLD AUTO: 0.9 % (ref 0–2)
BILIRUB DIRECT SERPL-MCNC: 0.2 MG/DL (ref 0–0.4)
BILIRUB SERPL-MCNC: 0.4 MG/DL (ref 0.2–1.3)
BUN SERPL-MCNC: 17 MG/DL (ref 7–20)
CALCIUM: 9.8 MG/DL (ref 8.4–10.2)
CHLORIDE SERPL-SCNC: 103 MMOL/L (ref 98–107)
CO2 SERPL-SCNC: 31 MMOL/L (ref 22–30)
EOSINOPHIL # BLD AUTO: 0.5 10^3/UL (ref 0–0.6)
EOSINOPHIL NFR BLD AUTO: 10.8 % (ref 0–6)
ERYTHROCYTE [DISTWIDTH] IN BLOOD BY AUTOMATED COUNT: 15.3 % (ref 11.5–14)
GLUCOSE SERPL-MCNC: 94 MG/DL (ref 75–110)
HCT VFR BLD CALC: 41.5 % (ref 37.9–51)
HGB BLD-MCNC: 13.6 G/DL (ref 13.5–17)
LYMPHOCYTES # BLD AUTO: 1.9 10^3/UL (ref 0.5–4.7)
LYMPHOCYTES NFR BLD AUTO: 43.8 % (ref 13–45)
MCH RBC QN AUTO: 28.9 PG (ref 27–33.4)
MCHC RBC AUTO-ENTMCNC: 32.7 G/DL (ref 32–36)
MCV RBC AUTO: 88 FL (ref 80–97)
MONOCYTES # BLD AUTO: 0.4 10^3/UL (ref 0.1–1.4)
MONOCYTES NFR BLD AUTO: 8.2 % (ref 3–13)
NEUTROPHILS # BLD AUTO: 1.6 10^3/UL (ref 1.7–8.2)
NEUTS SEG NFR BLD AUTO: 36.3 % (ref 42–78)
NT PRO BNP: 68 PG/ML (ref ?–125)
PLATELET # BLD: 237 10^3/UL (ref 150–450)
POTASSIUM SERPL-SCNC: 4.4 MMOL/L (ref 3.6–5)
PROT SERPL-MCNC: 8.2 G/DL (ref 6.3–8.2)
RBC # BLD AUTO: 4.71 10^6/UL (ref 4.35–5.55)
TOTAL CELLS COUNTED % (AUTO): 100 %
TROPONIN I SERPL-MCNC: < 0.012 NG/ML
WBC # BLD AUTO: 4.4 10^3/UL (ref 4–10.5)

## 2020-11-19 PROCEDURE — 93010 ELECTROCARDIOGRAM REPORT: CPT

## 2020-11-19 PROCEDURE — 71045 X-RAY EXAM CHEST 1 VIEW: CPT

## 2020-11-19 PROCEDURE — 83880 ASSAY OF NATRIURETIC PEPTIDE: CPT

## 2020-11-19 PROCEDURE — 83735 ASSAY OF MAGNESIUM: CPT

## 2020-11-19 PROCEDURE — 96374 THER/PROPH/DIAG INJ IV PUSH: CPT

## 2020-11-19 PROCEDURE — 99285 EMERGENCY DEPT VISIT HI MDM: CPT

## 2020-11-19 PROCEDURE — 85025 COMPLETE CBC W/AUTO DIFF WBC: CPT

## 2020-11-19 PROCEDURE — 80053 COMPREHEN METABOLIC PANEL: CPT

## 2020-11-19 PROCEDURE — 84484 ASSAY OF TROPONIN QUANT: CPT

## 2020-11-19 PROCEDURE — 36415 COLL VENOUS BLD VENIPUNCTURE: CPT

## 2020-11-19 PROCEDURE — 93005 ELECTROCARDIOGRAM TRACING: CPT

## 2020-11-19 RX ADMIN — ALBUTEROL SULFATE SCH MG: 2.5 SOLUTION RESPIRATORY (INHALATION) at 21:42

## 2020-11-19 RX ADMIN — ALBUTEROL SULFATE SCH MG: 2.5 SOLUTION RESPIRATORY (INHALATION) at 21:24

## 2020-11-19 NOTE — ER DOCUMENT REPORT
ED Respiratory Problem





- General


Chief Complaint: Shortness Of Breath


Stated Complaint: SHORTNESS OF BREATH


Time Seen by Provider: 11/19/20 20:22


Primary Care Provider: 


[facility] [[facility] - Follow up as needed


Mode of Arrival: Ambulatory


Notes: 





CHIEF COMPLAINT: Shortness of breath for 4 days





HPI: 65-year-old male presenting for shortness of breath for 4 days.  History of

COPD, 1-2 pack-a-day smoker.  States he normally has an albuterol inhaler and 

when he gets short of breath like this he uses it and it works but over the last

several days he has noticed that the shortness of breath continues again within 

30 to 60 minutes after using the inhaler.  Did not call her primary care 

provider for evaluation of his symptoms did not stop smoking.  Has no chest pain

no fever.





ROS: See HPI - all other systems were reviewed and are otherwise negative


Constitutional: no fever 


Eyes: no drainage, no blurred vision


ENT: no runny nose, no sore throat


Cardiovascular:  no chest pain 


Resp: + SOB, positive chronic cough


GI: no vomiting, no diarrhea, no abdominal pain


: no dysuria


Integumentary: no rash 


Allergy: no hives 


Musculoskeletal: no extremity pain or swelling 


Neurological: no numbness/tingling, no weakness





MEDICATIONS: I agree with the patient medications as charted by the RN.





ALLERGIES: I agree with the allergies as charted by the RN.





PAST MEDICAL HISTORY/PAST SURGICAL HISTORY: Reviewed and agree as charted by RN.





SOCIAL HISTORY: Reviewed and agree as charted by RN.





FAMILY HISTORY: No significant familial comorbid conditions directly related to 

patient complaint





EXAM:


Reviewed vital signs as charted by RN.


CONSTITUTIONAL: Alert and oriented and responds appropriately to questions. 

Well-appearing; well-nourished


HEAD: Normocephalic; atraumatic


EYES: PERRL; Conjunctivae clear, sclerae non-icteric


ENT: normal nose; no rhinorrhea; moist mucous membranes; pharynx without lesions

noted, no uvula edema or deviation, no tonsillar hypertrophy, phonation normal


NECK: Supple without meningismus; non-tender; no cervical lymphadenopathy, no 

masses


CARD: RRR; no murmurs, no clicks, no rubs, no gallops; symmetric distal pulses


RESP: Normal chest excursion without splinting or tachypnea; breath sounds 

decreased bilaterally with expiratory wheezing noted, no rhonchi, no rales, 

pulse oximetry 94% on room air not hypoxic


ABD/GI: Normal bowel sounds; non-distended; soft, non-tender, no rebound, no 

guarding; no palpable organomegaly or masses.


BACK:  The back appears normal and is non-tender to palpation, there is no CVA 

tenderness


EXT: Normal ROM in all joints; non-tender to palpation; no cyanosis, no 

effusions, no edema   


SKIN: Normal color for age and race; warm; dry; good turgor; no acute lesions 

noted


NEURO: Moves all extremities equally; Motor and sensory function intact 


PSYCH: The patient's mood and manner are appropriate. Grooming and personal 

hygiene are appropriate.





MDM: EKG shows a sinus arrhythmia with a varying rate between 50 and 75.  

 .  Abnormal EKG.  Interpreted by emergency department physicians. 

65-year-old male with likely COPD exacerbation.  Initial screening labs 

including cardiac labs done via the triage process were all negative.  Negative 

BNP negative troponin.  Chest x-ray negative for pneumonia.  Patient is not 

concerned about Covid.  Has received 2 breathing treatments and steroids, still 

having some expiratory wheezing will give additional breathing treatment and 

magnesium IV and reassess, if breathing is improved will prescribe steroids, 

inhaler and close follow-up with recommendation to stop smoking, low suspicion 

for ACS


TRAVEL OUTSIDE OF THE [place] IN LAST 30 DAYS: No





- Related Data


Allergies/Adverse Reactions: 


                                        





No Known Allergies Allergy (Unverified 11/09/19 17:26)


   











Past Medical History





- General


Information source: Patient





- Social History


Smoking Status: Current Every Day Smoker


Frequency of alcohol use: Heavy


Drug Abuse: Marijuana


Family History: Reviewed & Not Pertinent


Patient has homicidal ideation: No


Pulmonary Medical History: Reports: Hx Asthma, Hx COPD


Past Surgical History: Reports: Other - Eye surgery





- Immunizations


Hx Diphtheria, Pertussis, Tetanus Vaccination: No





Physical Exam





- Vital signs


Vitals: 


                                        











Temp Pulse Resp BP Pulse Ox


 


 98.2 F   73   18   137/79 H  94 


 


 11/19/20 20:13  11/19/20 20:13  11/19/20 20:13  11/19/20 20:13  11/19/20 20:13














Course





- Re-evaluation


Re-evalutation: 





11/20/20 00:18


As nursing was starting the magnesium drip I noted that the patient's magnesium 

level that I had ordered had come back at slightly elevated at 2.5.  I was able 

to have them stop the drip as it started.  Patient did receive another breathing

treatment, he is no longer having significant wheezing although he still 

slightly expiratory wheezing.  He is not hypoxic his pulse oximetry is 98% on 

room air at this time.  Will discharge home with instruction to stop smoking, 

keep him on prednisone, albuterol inhaler.





- Vital Signs


Vital signs: 


                                        











Temp Pulse Resp BP Pulse Ox


 


 98.2 F   73   18   137/79 H  94 


 


 11/19/20 20:13  11/19/20 20:13  11/19/20 20:13  11/19/20 20:13  11/19/20 20:13














- Laboratory


Result Diagrams: 


                                 11/19/20 21:12





                                 11/19/20 21:12


Laboratory results interpreted by me: 


                                        











  11/19/20 11/19/20 11/19/20





  21:12 21:12 21:12


 


RDW  15.3 H  


 


Eos % (Auto)  10.8 H  


 


Absolute Neuts (auto)  1.6 L  


 


Seg Neutrophils %  36.3 L  


 


Carbon Dioxide   31 H 


 


Creatinine   1.38 H 


 


Est GFR (MDRD) Non-Af   52 L 


 


Magnesium    2.5 H














Discharge





- Discharge


Clinical Impression: 


 COPD exacerbation, Hypermagnesemia, Tobacco abuse





Condition: Stable


Disposition: HOME, SELF-CARE


Additional Instructions: 


Stop smoking.  Use the albuterol inhaler 2 puffs every 4 hours as needed for 

shortness of breath.  Take the prednisone as prescribed.  Follow-up with your 

primary care provider for reevaluation of symptoms return for worsening 

shortness of breath


Prescriptions: 


Prednisone [Deltasone 20 mg Tablet] 2 tab PO DAILY 5 Days #10 tablet


Albuterol Sulfate [Proair HFA Inhalation Aerosol 8.5 gm MDI] 2 puff IH Q4H PRN 

#1 mdi


 PRN Reason: 


Referrals: 


[facility] [[facility] - Follow up as needed

## 2020-11-19 NOTE — ER DOCUMENT REPORT
ED Medical Screen (RME)





- General


Chief Complaint: Shortness Of Breath


Stated Complaint: SHORTNESS OF BREATH


Time Seen by Provider: 11/19/20 20:22


Primary Care Provider: 


ELEAZAR BARTHOLOMEW [Primary Care Provider] - Follow up as needed


Mode of Arrival: Ambulatory


Information source: Patient


Notes: 





Patient presents complaining of shortness of breath for the past 4 days.  Slade garcia recently tried to quit smoking but was unsuccessful.  Patient denies any 

fever or chest pain.  Patient does have a history of COPD.





I have greeted and performed a rapid initial assessment of this patient.  A 

comprehensive ED assessment and evaluation of the patient, analysis of test 

results and completion of the medical decision making process will be conducted 

by additional ED providers.


TRAVEL OUTSIDE OF THE [place] IN LAST 30 DAYS: No





- Related Data


Allergies/Adverse Reactions: 


                                        





No Known Allergies Allergy (Unverified 11/09/19 17:26)


   











Past Medical History


Pulmonary Medical History: Reports: Hx Asthma, Hx COPD


Past Surgical History: Reports: Other - Eye surgery





- Immunizations


Hx Diphtheria, Pertussis, Tetanus Vaccination: No





Physical Exam





- Vital signs


Vitals: 





                                        











Temp Pulse Resp BP Pulse Ox


 


 98.2 F   73   18   137/79 H  94 


 


 11/19/20 20:13  11/19/20 20:13  11/19/20 20:13  11/19/20 20:13  11/19/20 20:13














- Respiratory


Respiratory status: No respiratory distress


Breath sounds: Nonproductive cough, Wheezing - Diffuse bilateral





- Cardiovascular


Rhythm: Regular


Heart sounds: S1 appreciated, S2 appreciated





Course





- Vital Signs


Vital signs: 





                                        











Temp Pulse Resp BP Pulse Ox


 


 98.2 F   73   18   137/79 H  94 


 


 11/19/20 20:13  11/19/20 20:13  11/19/20 20:13  11/19/20 20:13  11/19/20 20:13














Doctor's Discharge





- Discharge


Referrals: 


[facility] [[facility] - Follow up as needed

## 2020-11-19 NOTE — RADIOLOGY REPORT (SQ)
EXAM DESCRIPTION:

Site:  CHEST SINGLE VIEW

RP: XR CHEST 1 VIEW 



CLINICAL HISTORY:

65 years  Male;  sob;



COMPARISON:

10/08/2020



FINDINGS:

Lungs: Lungs are clear, with no focal infiltrate, pneumothorax,

or pleural effusion. 

Mediastinum: Mediastinum is within normal limits for this

positioning.

Bones: Bony structures are unremarkable.



IMPRESSION:

1.  No acute pulmonary findings.

## 2020-11-19 NOTE — EKG REPORT
SEVERITY:- ABNORMAL ECG -

SINUS ARRHYTHMIA, RATE 50-75

BIATRIAL ABNORMALITIES

:

Confirmed by: Bhavya Farris MD 19-Nov-2020 22:28:06

## 2020-11-20 VITALS — SYSTOLIC BLOOD PRESSURE: 149 MMHG | DIASTOLIC BLOOD PRESSURE: 77 MMHG

## 2020-12-07 ENCOUNTER — HOSPITAL ENCOUNTER (EMERGENCY)
Dept: HOSPITAL 62 - ER | Age: 65
Discharge: HOME | End: 2020-12-07
Payer: OTHER GOVERNMENT

## 2020-12-07 VITALS — DIASTOLIC BLOOD PRESSURE: 90 MMHG | SYSTOLIC BLOOD PRESSURE: 158 MMHG

## 2020-12-07 DIAGNOSIS — F17.200: ICD-10-CM

## 2020-12-07 DIAGNOSIS — J44.1: Primary | ICD-10-CM

## 2020-12-07 LAB
ADD MANUAL DIFF: NO
ALBUMIN SERPL-MCNC: 4.3 G/DL (ref 3.5–5)
ALP SERPL-CCNC: 67 U/L (ref 38–126)
ANION GAP SERPL CALC-SCNC: 7 MMOL/L (ref 5–19)
AST SERPL-CCNC: 39 U/L (ref 17–59)
BASOPHILS # BLD AUTO: 0 10^3/UL (ref 0–0.2)
BASOPHILS NFR BLD AUTO: 0.3 % (ref 0–2)
BILIRUB DIRECT SERPL-MCNC: 0.2 MG/DL (ref 0–0.4)
BILIRUB SERPL-MCNC: 0.5 MG/DL (ref 0.2–1.3)
BUN SERPL-MCNC: 12 MG/DL (ref 7–20)
CALCIUM: 9.5 MG/DL (ref 8.4–10.2)
CHLORIDE SERPL-SCNC: 104 MMOL/L (ref 98–107)
CK MB SERPL-MCNC: 4.37 NG/ML (ref ?–4.55)
CK SERPL-CCNC: 287 U/L (ref 55–170)
CO2 SERPL-SCNC: 31 MMOL/L (ref 22–30)
EOSINOPHIL # BLD AUTO: 0.2 10^3/UL (ref 0–0.6)
EOSINOPHIL NFR BLD AUTO: 2.2 % (ref 0–6)
ERYTHROCYTE [DISTWIDTH] IN BLOOD BY AUTOMATED COUNT: 15.4 % (ref 11.5–14)
GLUCOSE SERPL-MCNC: 102 MG/DL (ref 75–110)
HCT VFR BLD CALC: 38.4 % (ref 37.9–51)
HGB BLD-MCNC: 12.4 G/DL (ref 13.5–17)
LYMPHOCYTES # BLD AUTO: 1.8 10^3/UL (ref 0.5–4.7)
LYMPHOCYTES NFR BLD AUTO: 23 % (ref 13–45)
MCH RBC QN AUTO: 28.5 PG (ref 27–33.4)
MCHC RBC AUTO-ENTMCNC: 32.4 G/DL (ref 32–36)
MCV RBC AUTO: 88 FL (ref 80–97)
MONOCYTES # BLD AUTO: 0.7 10^3/UL (ref 0.1–1.4)
MONOCYTES NFR BLD AUTO: 9.1 % (ref 3–13)
NEUTROPHILS # BLD AUTO: 5 10^3/UL (ref 1.7–8.2)
NEUTS SEG NFR BLD AUTO: 65.4 % (ref 42–78)
PLATELET # BLD: 211 10^3/UL (ref 150–450)
POTASSIUM SERPL-SCNC: 3.8 MMOL/L (ref 3.6–5)
PROT SERPL-MCNC: 7.5 G/DL (ref 6.3–8.2)
RBC # BLD AUTO: 4.36 10^6/UL (ref 4.35–5.55)
TOTAL CELLS COUNTED % (AUTO): 100 %
TROPONIN I SERPL-MCNC: < 0.012 NG/ML
WBC # BLD AUTO: 7.6 10^3/UL (ref 4–10.5)

## 2020-12-07 PROCEDURE — 94640 AIRWAY INHALATION TREATMENT: CPT

## 2020-12-07 PROCEDURE — 85025 COMPLETE CBC W/AUTO DIFF WBC: CPT

## 2020-12-07 PROCEDURE — 71045 X-RAY EXAM CHEST 1 VIEW: CPT

## 2020-12-07 PROCEDURE — 36415 COLL VENOUS BLD VENIPUNCTURE: CPT

## 2020-12-07 PROCEDURE — 82550 ASSAY OF CK (CPK): CPT

## 2020-12-07 PROCEDURE — 84484 ASSAY OF TROPONIN QUANT: CPT

## 2020-12-07 PROCEDURE — 82553 CREATINE MB FRACTION: CPT

## 2020-12-07 PROCEDURE — 99285 EMERGENCY DEPT VISIT HI MDM: CPT

## 2020-12-07 PROCEDURE — 96360 HYDRATION IV INFUSION INIT: CPT

## 2020-12-07 PROCEDURE — 80053 COMPREHEN METABOLIC PANEL: CPT

## 2020-12-07 PROCEDURE — 93005 ELECTROCARDIOGRAM TRACING: CPT

## 2020-12-07 PROCEDURE — 93010 ELECTROCARDIOGRAM REPORT: CPT

## 2020-12-07 NOTE — EKG REPORT
SEVERITY:- ABNORMAL ECG -

SINUS RHYTHM

PROBABLE LEFT VENTRICULAR HYPERTROPHY

:

Confirmed by: Mary Anne Howard 07-Dec-2020 08:04:05

## 2020-12-07 NOTE — RADIOLOGY REPORT (SQ)
CHEST X-RAY 1 VIEW on 12/7/2020 at 6:57 AM 



CLINICAL INDICATION: Chest pain, cough



COMPARISON: 11/19/2020



FINDINGS: Emphysematous changes of the lungs are noted. There is

evidence of calcified granulomatous disease in the chest. The

lungs are otherwise clear. Cardiac, hilar and mediastinal

contours are within normal limits. Pulmonary vascularity is

within normal limits.



IMPRESSION: No acute disease.

## 2020-12-07 NOTE — ER DOCUMENT REPORT
Entered by TRU DE JESUS SCRIBE  12/07/20 0707 





Acting as scribe for:TARIK CHIANG MD





ED Respiratory Problem





- General


Chief Complaint: Shortness Of Breath


Stated Complaint: DIFFICULTY BREATHING


Primary Care Provider: 


[facility] [[facility]] - Follow up as needed


Mode of Arrival: Medic


Information source: Patient, Emergency Med Personnel


Notes: 





This 65 year old male patient with a history of COPD presents to the ED today 

via EMS with complaints of shortness of breath with associated cough and dyspnea

on exertion that started around 1800 last night. Patient states that he has been

using his inhaler multiple times without relief, but his breathing wasn't 

getting any better, so he called EMS. Patient received 125 mg Solumedrol, 2 g 

Mag, and x2 Albuterol treatments. Denies any COVID concerns.





TRAVEL OUTSIDE OF THE [place] IN LAST 30 DAYS: No





- Related Data


Allergies/Adverse Reactions: 


                                        





No Known Allergies Allergy (Unverified 11/09/19 17:26)


   











Past Medical History





- General


Information source: Patient, [facility] Records





- Social History


Smoking Status: Current Every Day Smoker


Smoking Education Provided: No


Drug Abuse: Cocaine


Family History: Reviewed & Not Pertinent


Patient has suicidal ideation: No


Patient has homicidal ideation: No


Pulmonary Medical History: Reports: Hx Asthma, Hx COPD


Past Surgical History: Reports: Other - Eye surgery





- Immunizations


Hx Diphtheria, Pertussis, Tetanus Vaccination: No





Review of Systems





- Review of Systems


Constitutional: No symptoms reported


EENT: No symptoms reported


Cardiovascular: See HPI, Dyspnea


Respiratory: See HPI, Cough, Short of breath


Gastrointestinal: No symptoms reported


Genitourinary: No symptoms reported


Male Genitourinary: No symptoms reported


Musculoskeletal: No symptoms reported


Skin: No symptoms reported


Hematologic/Lymphatic: No symptoms reported


Neurological/Psychological: No symptoms reported


-: Yes All other systems reviewed and negative





Physical Exam





- Vital signs


Vitals: 


                                        











Temp


 


 98.1 F 


 


 12/07/20 06:11














- General


General appearance: Alert


In distress: None





- HEENT


Head: Normocephalic, Atraumatic


Eyes: Normal


Pupils: PERRL


Neck: Normal, Supple





- Respiratory


Respiratory status: No respiratory distress, Other - 100% on 2L via NC.  No: 

Tachypnea


Chest status: Nontender


Breath sounds: Wheezing - Trace inspiratory and expiratory wheezing


Chest palpation: Normal





- Cardiovascular


Rhythm: Regular


Heart sounds: Normal auscultation


Murmur: No


Friction rub: No


Gallop: None auscultated





- Abdominal


Inspection: Normal


Distension: No distension


Bowel sounds: Normal


Tenderness: Nontender - Abdomen soft


Organomegaly: No organomegaly





- Back


Back: Normal, Nontender





- Extremities


General upper extremity: Normal inspection


General lower extremity: Normal inspection.  No: Edema





- Neurological


Neuro grossly intact: Yes


Orientation: AAOx4


[place] Coma Scale Eye Opening: Spontaneous


[place] Coma Scale Verbal: Oriented


[place] Coma Scale Motor: Obeys Commands


[place] Coma Scale Total: 15





- Psychological


Associated symptoms: Normal affect, Normal mood





- Skin


Skin Temperature: Warm


Skin Moisture: Dry


Skin Color: Normal





Course





- Re-evaluation


Re-evalutation: 





12/07/20 10:55


Patient resting comfortably.  Not showing any signs of distress.





- Vital Signs


Vital signs: 


                                        











Temp Pulse Resp BP Pulse Ox


 


 98.1 F      17   161/95 H  99 


 


 12/07/20 06:11     12/07/20 07:00  12/07/20 06:13  12/07/20 07:00











12/07/20 10:55


Vital signs are stable blood pressure is 161/95.





- Laboratory


Result Diagrams: 


                                 12/07/20 06:25





                                 12/07/20 06:25


Laboratory results interpreted by me: 


                                        











  12/07/20 12/07/20





  06:25 06:25


 


Hgb  12.4 L 


 


RDW  15.4 H 


 


Carbon Dioxide   31 H


 


Creatine Kinase   287 H











                                        





                                 12/07/20 06:25 





                                 12/07/20 06:25 





                                        











MCV  88 fl (80-97)   12/07/20  06:25    


 


MCH  28.5 pg (27.0-33.4)   12/07/20  06:25    


 


MCHC  32.4 g/dL (32.0-36.0)   12/07/20  06:25    


 


RDW  15.4 % (11.5-14.0)  H  12/07/20  06:25    


 


Seg Neutrophils %  65.4 % (42-78)   12/07/20  06:25    


 


Chloride  104 mmol/L ()   12/07/20  06:25    


 


Carbon Dioxide  31 mmol/L (22-30)  H  12/07/20  06:25    


 


Anion Gap  7  (5-19)   12/07/20  06:25    


 


Est GFR ( Amer)  > 60  (>60)   12/07/20  06:25    


 


Glucose  102 mg/dL ()   12/07/20  06:25    


 


Calcium  9.5 mg/dL (8.4-10.2)   12/07/20  06:25    


 


Total Bilirubin  0.5 mg/dL (0.2-1.3)   12/07/20  06:25    


 


AST  39 U/L (17-59)   12/07/20  06:25    


 


Alkaline Phosphatase  67 U/L ()   12/07/20  06:25    


 


Total Protein  7.5 g/dL (6.3-8.2)   12/07/20  06:25    


 


Albumin  4.3 g/dL (3.5-5.0)   12/07/20  06:25    








                                        











  12/07/20 12/07/20 12/07/20





  06:25 06:25 09:21


 


Creatine Kinase  287 H  


 


CK-MB (CK-2)   4.37 


 


Troponin I   < 0.012  0.029








Not any patient's laboratories within normal range.  There is no elevation in 

troponin patient has no chest pain while in the department





- Diagnostic Test


Radiology reviewed: Image reviewed, Reports reviewed


Radiology results interpreted by me: 





12/07/20 10:57





                                        





Chest X-Ray  12/07/20 06:47


IMPRESSION: No acute disease. 


 








Chest x-ray shows no acute process.





- EKG Interpretation by Me


Additional EKG results interpreted by me: 





12/07/20 10:57


Twelve-lead EKG shows normal sinus rhythm rate of 79 normal axis normal PE RR 

interval normal QRS interval and normal QT interval no acute ST elevations to 

suggest STEMI left ventricular hypertrophy noted based on voltage criteria.





Discharge





- Discharge


Clinical Impression: 


 COPD exacerbation





Condition: Stable


Disposition: HOME, SELF-CARE


Additional Instructions: 


Chronic Obstructive Lung Disease





     You have chronic obstructive lung disease (COPD).  The symptoms come from 

emphysema (damage to small airways, with trapping of air in large sacks in the 

lung) and chronic bronchitis (repeated infection and damage to larger airways). 

The cause is almost always cigarette smoking, although dust exposure, asthma, 

and infections contribute.


     You should avoid fumes, dust, and smoke (especially tobacco smoke).  Your 

condition will flare from time to time.  There is no cure, but the symptoms can 

be treated.


     Bronchodilators (asthma medicine) are often helpful.  Antibiotics help when

infection is present.  When shortness of breath is severe, we may prescribe 

cortisone medication.  If medicine doesn't help enough, we can arrange for you 

to have an oxygen tank at home.


     Notify your doctor at once if sputum becomes thick, foul, or bloody, if you

develop a fever or chest pain, or if your shortness of breath worsens.


Prescriptions: 


Prednisone [Deltasone 20 mg Tablet] 2 tab PO DAILY 5 Days #10 tablet


Referrals: 


[facility] [[facility] - Follow up as needed





I personally performed the services described in the documentation, reviewed and

edited the documentation which was dictated to the scribe in my presence, and it

accurately records my words and actions.

## 2021-01-14 ENCOUNTER — HOSPITAL ENCOUNTER (EMERGENCY)
Dept: HOSPITAL 62 - ER | Age: 66
Discharge: HOME | End: 2021-01-14
Payer: OTHER GOVERNMENT

## 2021-01-14 VITALS — SYSTOLIC BLOOD PRESSURE: 128 MMHG | DIASTOLIC BLOOD PRESSURE: 70 MMHG

## 2021-01-14 DIAGNOSIS — F17.200: ICD-10-CM

## 2021-01-14 DIAGNOSIS — Z79.52: ICD-10-CM

## 2021-01-14 DIAGNOSIS — Z79.899: ICD-10-CM

## 2021-01-14 DIAGNOSIS — J44.1: Primary | ICD-10-CM

## 2021-01-14 DIAGNOSIS — R05: ICD-10-CM

## 2021-01-14 DIAGNOSIS — R06.02: ICD-10-CM

## 2021-01-14 DIAGNOSIS — F14.10: ICD-10-CM

## 2021-01-14 LAB
ABSOLUTE LYMPHOCYTES# (MANUAL): 0.7 10^3/UL (ref 0.5–4.7)
ABSOLUTE MONOCYTES # (MANUAL): 0 10^3/UL (ref 0.1–1.4)
ADD MANUAL DIFF: YES
ANISOCYTOSIS BLD QL SMEAR: SLIGHT
APPEARANCE UR: CLEAR
APTT PPP: COLORLESS S
BASOPHILS NFR BLD MANUAL: 0 % (ref 0–2)
BILIRUB UR QL STRIP: NEGATIVE
EOSINOPHIL NFR BLD MANUAL: 2 % (ref 0–6)
ERYTHROCYTE [DISTWIDTH] IN BLOOD BY AUTOMATED COUNT: 15 % (ref 11.5–14)
GLUCOSE UR STRIP-MCNC: NEGATIVE MG/DL
HCT VFR BLD CALC: 45 % (ref 37.9–51)
HGB BLD-MCNC: 14.6 G/DL (ref 13.5–17)
KETONES UR STRIP-MCNC: NEGATIVE MG/DL
MCH RBC QN AUTO: 28.1 PG (ref 27–33.4)
MCHC RBC AUTO-ENTMCNC: 32.4 G/DL (ref 32–36)
MCV RBC AUTO: 87 FL (ref 80–97)
MONOCYTES % (MANUAL): 0 % (ref 3–13)
NITRITE UR QL STRIP: NEGATIVE
PH UR STRIP: 7 [PH] (ref 5–9)
PLATELET # BLD: 227 10^3/UL (ref 150–450)
PLATELET COMMENT: ADEQUATE
PROT UR STRIP-MCNC: 100 MG/DL
RBC # BLD AUTO: 5.19 10^6/UL (ref 4.35–5.55)
SEGMENTED NEUTROPHILS % (MAN): 92 % (ref 42–78)
SP GR UR STRIP: 1.01
TOTAL CELLS COUNTED BLD: 100
UROBILINOGEN UR-MCNC: NEGATIVE MG/DL (ref ?–2)
VARIANT LYMPHS NFR BLD MANUAL: 6 % (ref 13–45)
WBC # BLD AUTO: 11 10^3/UL (ref 4–10.5)

## 2021-01-14 PROCEDURE — 71045 X-RAY EXAM CHEST 1 VIEW: CPT

## 2021-01-14 PROCEDURE — 93010 ELECTROCARDIOGRAM REPORT: CPT

## 2021-01-14 PROCEDURE — 36415 COLL VENOUS BLD VENIPUNCTURE: CPT

## 2021-01-14 PROCEDURE — 93005 ELECTROCARDIOGRAM TRACING: CPT

## 2021-01-14 PROCEDURE — 85025 COMPLETE CBC W/AUTO DIFF WBC: CPT

## 2021-01-14 PROCEDURE — 94640 AIRWAY INHALATION TREATMENT: CPT

## 2021-01-14 PROCEDURE — 99285 EMERGENCY DEPT VISIT HI MDM: CPT

## 2021-01-14 PROCEDURE — 81001 URINALYSIS AUTO W/SCOPE: CPT

## 2021-01-14 NOTE — RADIOLOGY REPORT (SQ)
EXAM DESCRIPTION:  CHEST SINGLE VIEW



IMAGES COMPLETED DATE/TIME:  1/14/2021 5:42 pm



REASON FOR STUDY:  sob



COMPARISON:  12/7/2020



EXAM PARAMETERS:  NUMBER OF VIEWS: One view.

TECHNIQUE: Single frontal radiographic view of the chest acquired.

RADIATION DOSE: NA

LIMITATIONS: None.



FINDINGS:  LUNGS AND PLEURA: Hyperexpansion of the lungs.  No infiltrate, effusion, or mass.

MEDIASTINUM AND HILAR STRUCTURES: No masses.  Contour normal.

HEART AND VASCULAR STRUCTURES: Heart normal in size.  Normal vasculature.

BONES: No acute findings.

HARDWARE: None in the chest.

OTHER: No other significant finding.



IMPRESSION:  Chronic lung changes with no acute cardiopulmonary findings.



TECHNICAL DOCUMENTATION:  JOB ID:  [id number]

 2011 [employer]- All Rights Reserved



Reading location - IP/workstation name: [email or web address]

## 2021-01-14 NOTE — EKG REPORT
SEVERITY:- ABNORMAL ECG -

SINUS RHYTHM

BIATRIAL ABNORMALITIES

:

Confirmed by: Bhavya Farris MD 14-Jan-2021 19:52:40

## 2021-01-14 NOTE — ER DOCUMENT REPORT
ED General





- General


Chief Complaint: Shortness Of Breath


Stated Complaint: SHORTNESS OF BREATH


Time Seen by Provider: 01/14/21 17:16


Primary Care Provider: 


[facility] [[facility]] - Follow up as needed


TRAVEL OUTSIDE OF THE [place] IN LAST 30 DAYS: No





- HPI


Notes: 





Patient is a 65-year-old male with a history of COPD who presents to the 

emergency department for evaluation of shortness of breath.  Symptoms started 

yesterday.  He has had a cough, intermittently productive of phlegm.  He denies 

any hemoptysis.  He denies any pain.  He said no fevers or chills.  No anosmia 

or difficulty with sense of taste.  No nausea, vomiting, diarrhea.  No known 

Covid exposures.  He was treated in route, states he is feeling significantly 

improved.





- Related Data


Allergies/Adverse Reactions: 


                                        





No Known Allergies Allergy (Verified 01/14/21 17:26)


   








Home Medications: Prednisone.  Albuterol





Past Medical History





- General


Information source: Patient





- Social History


Smoking Status: Current Every Day Smoker


Chew tobacco use (# tins/day): No


Frequency of alcohol use: Occasional


Drug Abuse: Cocaine


Family History: Reviewed & Not Pertinent


Pulmonary Medical History: Reports: Hx Asthma, Hx COPD


Past Surgical History: Reports: Other - Eye surgery





- Immunizations


Hx Diphtheria, Pertussis, Tetanus Vaccination: No





Review of Systems





- Review of Systems


Constitutional: No symptoms reported


EENT: No symptoms reported


Cardiovascular: No symptoms reported


Respiratory: See HPI


Gastrointestinal: No symptoms reported


Genitourinary: No symptoms reported


Musculoskeletal: No symptoms reported


Skin: No symptoms reported


Neurological/Psychological: No symptoms reported





Physical Exam





- Vital signs


Vitals: 


                                        











Resp


 


 25 H


 


 01/14/21 16:46














- Notes


Notes: 





This is a 65-year-old male who appears his stated age, no acute distress.  Vital

signs reviewed, please refer to chart. Head is normocephalic, atraumatic.  

Pupils equal round, reactive to light.  Neck is supple without meningismus.  

Heart is regular rate and rhythm.  Lungs reveal prolonged expiratory phase and 

expiratory wheezes throughout.  Abdomen is soft, nontender, normoactive bowel so

unds throughout.  Extremities without cyanosis, clubbing. Posterior calves are 

nontender.  Peripheral pulses are equal.  Skin is warm and dry.  Patient is 

awake, alert, neurological exam is nonfocal.





Course





- Re-evaluation


Re-evalutation: 





01/14/21 18:13


Patient presents emergency department for evaluation.  He was brought into a r

oom and orders were placed as per nursing protocols.  Patient received 

albuterol, magnesium, Solu-Medrol in route.  I will order further DuoNeb.  He 

was initially placed on oxygen, orders have been given to the nurse to keep his 

oxygenation around 92%.  Chest x-ray interpreted by myself as showing findings 

consistent with COPD without any signs of infiltrate.  Patient is subjectively 

feeling significantly improved.  Awaiting labs, we will continue to monitor.


01/14/21 20:11


Patient's labs are unremarkable.  He is 92% and higher on room air.  He is 

feeling improved.  I will treat him as a COPD exacerbation with antibiotics and 

steroids, will also write prescription for an albuterol inhaler as he does not 

have a nebulizer and he is unsure as to all of the inhalers he has at home.  [facility] 

[facility] return to the ED with worsening or new 

concerning symptoms of any sort.





- Vital Signs


Vital signs: 


                                        











Temp Pulse Resp BP Pulse Ox


 


       17   122/68   93 


 


       01/14/21 19:55  01/14/21 19:55  01/14/21 19:55














- Laboratory Results


Result Diagrams: 


                                 01/14/21 18:20





Laboratory Results Interpreted: 


                                        











  01/14/21 01/14/21





  16:49 18:20


 


WBC   11.0 H


 


RDW   15.0 H


 


Seg Neuts % (Manual)   92 H


 


Lymphocytes % (Manual)   6 L


 


Monocytes % (Manual)   0 L


 


Abs Neuts (Manual)   10.1 H


 


Abs Monocytes (Manual)   0.0 L


 


Urine Protein  100 H 


 


Urine Blood  SMALL H 











Critical Laboratory Results Reviewed: No Critical Results





- Radiology Results


Radiology Results Interpreted: 





01/14/21 18:14





                                        





Chest X-Ray  01/14/21 17:11


IMPRESSION:  Chronic lung changes with no acute cardiopulmonary findings.


 











Critical Radiology Results Reviewed: No Critical Results





- EKG Interpretation by Me


Additional EKG results interpreted by me: 





01/14/21 20:19


Sinus mechanism in the 80s.  Normal axis and intervals.  No acute ST changes 

concerning for ischemia or infarction.  No old studies immediately available for

comparison.





Discharge





- Discharge


Clinical Impression: 


 COPD exacerbation





Condition: Stable


Disposition: HOME, SELF-CARE


Instructions:  Chronic Obstructive Lung Disease ([facility])


Additional Instructions: 


Rest.  Please try to continue to quit smoking.  Take all of the medications as 

prescribed until they are gone, starting tomorrow.  [facility] 

[facility].  If you develop worsening or new concerning symptoms of any sort, return 

immediately to the emergency department for evaluation.


Prescriptions: 


Prednisone [Deltasone 20 mg Tablet] See Protocol PO DAILY 5 Days #20 tablet


Doxycycline Hyclate 100 mg PO BID #13 tablet.


Albuterol Sulfate [Proair HFA Inhalation Aerosol 8.5 gm MDI] 2 puff IH Q4H PRN 

#1 mdi


 PRN Reason: 


Referrals: 


[facility] [[facility] - Follow up as needed